# Patient Record
Sex: FEMALE | Race: BLACK OR AFRICAN AMERICAN | Employment: FULL TIME | URBAN - METROPOLITAN AREA
[De-identification: names, ages, dates, MRNs, and addresses within clinical notes are randomized per-mention and may not be internally consistent; named-entity substitution may affect disease eponyms.]

---

## 2017-01-13 ENCOUNTER — OFFICE VISIT (OUTPATIENT)
Dept: FAMILY MEDICINE CLINIC | Age: 47
End: 2017-01-13

## 2017-01-13 VITALS
OXYGEN SATURATION: 99 % | DIASTOLIC BLOOD PRESSURE: 85 MMHG | BODY MASS INDEX: 28 KG/M2 | HEIGHT: 64 IN | WEIGHT: 164 LBS | TEMPERATURE: 99.1 F | HEART RATE: 102 BPM | SYSTOLIC BLOOD PRESSURE: 132 MMHG | RESPIRATION RATE: 18 BRPM

## 2017-01-13 DIAGNOSIS — B96.89 ACUTE BACTERIAL SINUSITIS: ICD-10-CM

## 2017-01-13 DIAGNOSIS — R00.0 TACHYCARDIA: ICD-10-CM

## 2017-01-13 DIAGNOSIS — J01.90 ACUTE BACTERIAL SINUSITIS: ICD-10-CM

## 2017-01-13 DIAGNOSIS — J02.9 SORE THROAT: Primary | ICD-10-CM

## 2017-01-13 LAB
QUICKVUE INFLUENZA TEST: NEGATIVE
S PYO AG THROAT QL: NEGATIVE
VALID INTERNAL CONTROL?: YES
VALID INTERNAL CONTROL?: YES

## 2017-01-13 RX ORDER — AMOXICILLIN AND CLAVULANATE POTASSIUM 875; 125 MG/1; MG/1
1 TABLET, FILM COATED ORAL 2 TIMES DAILY
Qty: 14 TAB | Refills: 0 | Status: SHIPPED | OUTPATIENT
Start: 2017-01-13 | End: 2017-01-20

## 2017-01-13 NOTE — PATIENT INSTRUCTIONS
Sore Throat: Care Instructions  Your Care Instructions    Infection by bacteria or a virus causes most sore throats. Cigarette smoke, dry air, air pollution, allergies, and yelling can also cause a sore throat. Sore throats can be painful and annoying. Fortunately, most sore throats go away on their own. If you have a bacterial infection, your doctor may prescribe antibiotics. Follow-up care is a key part of your treatment and safety. Be sure to make and go to all appointments, and call your doctor if you are having problems. It's also a good idea to know your test results and keep a list of the medicines you take. How can you care for yourself at home? · If your doctor prescribed antibiotics, take them as directed. Do not stop taking them just because you feel better. You need to take the full course of antibiotics. · Gargle with warm salt water once an hour to help reduce swelling and relieve discomfort. Use 1 teaspoon of salt mixed in 1 cup of warm water. · Take an over-the-counter pain medicine, such as acetaminophen (Tylenol), ibuprofen (Advil, Motrin), or naproxen (Aleve). Read and follow all instructions on the label. · Be careful when taking over-the-counter cold or flu medicines and Tylenol at the same time. Many of these medicines have acetaminophen, which is Tylenol. Read the labels to make sure that you are not taking more than the recommended dose. Too much acetaminophen (Tylenol) can be harmful. · Drink plenty of fluids. Fluids may help soothe an irritated throat. Hot fluids, such as tea or soup, may help decrease throat pain. · Use over-the-counter throat lozenges to soothe pain. Regular cough drops or hard candy may also help. These should not be given to young children because of the risk of choking. · Do not smoke or allow others to smoke around you. If you need help quitting, talk to your doctor about stop-smoking programs and medicines.  These can increase your chances of quitting for good. · Use a vaporizer or humidifier to add moisture to your bedroom. Follow the directions for cleaning the machine. When should you call for help? Call your doctor now or seek immediate medical care if:  · You have new or worse trouble swallowing. · Your sore throat gets much worse on one side. Watch closely for changes in your health, and be sure to contact your doctor if you do not get better as expected. Where can you learn more? Go to http://natalie-racquel.info/. Enter [id number] [id number] in the search box to learn more about \"Sore Throat: Care Instructions. \"  Current as of: July 29, 2016  Content Version: 11.1  © 4999-6916 [employer]. Care instructions adapted under license by [employer] (which disclaims liability or warranty for this information). If you have questions about a medical condition or this instruction, always ask your healthcare professional. [place] [address] any warranty or liability for your use of this information. Sinusitis: Care Instructions  Your Care Instructions    Sinusitis is an infection of the lining of the sinus cavities in your head. Sinusitis often follows a cold. It causes pain and pressure in your head and face. In most cases, sinusitis gets better on its own in 1 to 2 weeks. But some mild symptoms may last for several weeks. Sometimes antibiotics are needed. Follow-up care is a key part of your treatment and safety. Be sure to make and go to all appointments, and call your doctor if you are having problems. It's also a good idea to know your test results and keep a list of the medicines you take. How can you care for yourself at home? · Take an over-the-counter pain medicine, such as acetaminophen (Tylenol), ibuprofen (Advil, Motrin), or naproxen (Aleve). Read and follow all instructions on the label. · If the doctor prescribed antibiotics, take them as directed.  Do not stop taking them just because you feel better. You need to take the full course of antibiotics. · Be careful when taking over-the-counter cold or flu medicines and Tylenol at the same time. Many of these medicines have acetaminophen, which is Tylenol. Read the labels to make sure that you are not taking more than the recommended dose. Too much acetaminophen (Tylenol) can be harmful. · Breathe warm, moist air from a steamy shower, a hot bath, or a sink filled with hot water. Avoid cold, dry air. Using a humidifier in your home may help. Follow the directions for cleaning the machine. · Use saline (saltwater) nasal washes to help keep your nasal passages open and wash out mucus and bacteria. You can buy saline nose drops at a grocery store or drugstore. Or you can make your own at home by adding 1 teaspoon of salt and 1 teaspoon of baking soda to 2 cups of distilled water. If you make your own, fill a bulb syringe with the solution, insert the tip into your nostril, and squeeze gently. Willia Slocumb your nose. · Put a hot, wet towel or a warm gel pack on your face 3 or 4 times a day for 5 to 10 minutes each time. · Try a decongestant nasal spray like oxymetazoline (Afrin). Do not use it for more than 3 days in a row. Using it for more than 3 days can make your congestion worse. When should you call for help? Call your doctor now or seek immediate medical care if:  · You have new or worse swelling or redness in your face or around your eyes. · You have a new or higher fever. Watch closely for changes in your health, and be sure to contact your doctor if:  · You have new or worse facial pain. · The mucus from your nose becomes thicker (like pus) or has new blood in it. · You are not getting better as expected. Where can you learn more? Go to http://natalie-racquel.info/. Enter W255 in the search box to learn more about \"Sinusitis: Care Instructions. \"  Current as of: July 29, 2016  Content Version: 11.1  © 8989-2751 [employer]orporated. Care instructions adapted under license by [employer] (which disclaims liability or warranty for this information). If you have questions about a medical condition or this instruction, always ask your healthcare professional. [address] any warranty or liability for your use of this information.

## 2017-01-13 NOTE — MR AVS SNAPSHOT
Visit Information Date & Time Provider Department Dept. Phone Encounter #  
 1/13/2017  3:00 PM Dee Arechiga NP Melina Watt 79 [id number] Upcoming Health Maintenance Date Due DTaP/Tdap/Td series (1 - Tdap) 8/5/1993 PAP AKA CERVICAL CYTOLOGY 8/5/1993 INFLUENZA AGE 9 TO ADULT 8/1/2016 Allergies as of 1/13/2017  Review Complete On: 1/13/2017 By: Dee Arechiga NP No Known Allergies Current Immunizations  Never Reviewed No immunizations on file. Not reviewed this visit You Were Diagnosed With   
  
 Codes Comments Sore throat    -  Primary ICD-10-CM: J02.9 ICD-9-CM: 871 Acute bacterial sinusitis     ICD-10-CM: J01.90, B96.89 
ICD-9-CM: 461.9 Tachycardia     ICD-10-CM: R00.0 ICD-9-CM: 918. 0 Vitals BP Pulse Temp Resp Height(growth percentile) Weight(growth percentile) 132/85 (BP 1 Location: Right arm, BP Patient Position: Sitting) (!) 102 99.1 °F (37.3 °C) (Oral) 18 5' 4\" (1.626 m) 164 lb (74.4 kg) LMP SpO2 BMI Smoking Status 01/10/2017 99% 28.15 kg/m2 Never Smoker BMI and BSA Data Body Mass Index Body Surface Area  
 28.15 kg/m 2 1.83 m 2 Preferred Pharmacy Pharmacy Name Phone [facility] [address], [facility] [facility] Your Updated Medication List  
  
   
This list is accurate as of: 1/13/17  4:19 PM.  Always use your most recent med list.  
  
  
  
  
 amoxicillin-clavulanate 875-125 mg per tablet Commonly known as:  AUGMENTIN Take 1 Tab by mouth two (2) times a day for 7 days. Indications: ACUTE BACTERIAL SINUSITIS Prescriptions Sent to Pharmacy Refills  
 amoxicillin-clavulanate (AUGMENTIN) 875-125 mg per tablet 0 Sig: Take 1 Tab by mouth two (2) times a day for 7 days. Indications: ACUTE BACTERIAL SINUSITIS  Class: Normal  
 Pharmacy: [id number] [facility] Ph #: [phone] Route: Oral  
  
We Performed the Following AMB POC RAPID INFLUENZA TEST [12875 CPT(R)] AMB POC RAPID STREP A [35629 CPT(R)] Patient Instructions Sore Throat: Care Instructions Your Care Instructions Infection by bacteria or a virus causes most sore throats. Cigarette smoke, dry air, air pollution, allergies, and yelling can also cause a sore throat. Sore throats can be painful and annoying. Fortunately, most sore throats go away on their own. If you have a bacterial infection, your doctor may prescribe antibiotics. Follow-up care is a key part of your treatment and safety. Be sure to make and go to all appointments, and call your doctor if you are having problems. It's also a good idea to know your test results and keep a list of the medicines you take. How can you care for yourself at home? · If your doctor prescribed antibiotics, take them as directed. Do not stop taking them just because you feel better. You need to take the full course of antibiotics. · Gargle with warm salt water once an hour to help reduce swelling and relieve discomfort. Use 1 teaspoon of salt mixed in 1 cup of warm water. · Take an over-the-counter pain medicine, such as acetaminophen (Tylenol), ibuprofen (Advil, Motrin), or naproxen (Aleve). Read and follow all instructions on the label. · Be careful when taking over-the-counter cold or flu medicines and Tylenol at the same time. Many of these medicines have acetaminophen, which is Tylenol. Read the labels to make sure that you are not taking more than the recommended dose. Too much acetaminophen (Tylenol) can be harmful. · Drink plenty of fluids. Fluids may help soothe an irritated throat. Hot fluids, such as tea or soup, may help decrease throat pain. · Use over-the-counter throat lozenges to soothe pain. Regular cough drops or hard candy may also help. These should not be given to young children because of the risk of choking. · Do not smoke or allow others to smoke around you. If you need help quitting, talk to your doctor about stop-smoking programs and medicines. These can increase your chances of quitting for good. · Use a vaporizer or humidifier to add moisture to your bedroom. Follow the directions for cleaning the machine. When should you call for help? Call your doctor now or seek immediate medical care if: 
· You have new or worse trouble swallowing. · Your sore throat gets much worse on one side. Watch closely for changes in your health, and be sure to contact your doctor if you do not get better as expected. Where can you learn more? Go to http://natalie-racquel.info/. Enter [phone] [phone] in the search box to learn more about \"Sore Throat: Care Instructions. \" Current as of: July 29, 2016 Content Version: 11.1 © 8973-9333 [employer]. Care instructions adapted under license by [employer] (which disclaims liability or warranty for this information). If you have questions about a medical condition or this instruction, always ask your healthcare professional. [address] any warranty or liability for your use of this information. Sinusitis: Care Instructions Your Care Instructions Sinusitis is an infection of the lining of the sinus cavities in your head. Sinusitis often follows a cold. It causes pain and pressure in your head and face. In most cases, sinusitis gets better on its own in 1 to 2 weeks. But some mild symptoms may last for several weeks. Sometimes antibiotics are needed. Follow-up care is a key part of your treatment and safety. Be sure to make and go to all appointments, and call your doctor if you are having problems. It's also a good idea to know your test results and keep a list of the medicines you take. How can you care for yourself at home?  
· Take an over-the-counter pain medicine, such as acetaminophen (Tylenol), ibuprofen (Advil, Motrin), or naproxen (Aleve). Read and follow all instructions on the label. · If the doctor prescribed antibiotics, take them as directed. Do not stop taking them just because you feel better. You need to take the full course of antibiotics. · Be careful when taking over-the-counter cold or flu medicines and Tylenol at the same time. Many of these medicines have acetaminophen, which is Tylenol. Read the labels to make sure that you are not taking more than the recommended dose. Too much acetaminophen (Tylenol) can be harmful. · Breathe warm, moist air from a steamy shower, a hot bath, or a sink filled with hot water. Avoid cold, dry air. Using a humidifier in your home may help. Follow the directions for cleaning the machine. · Use saline (saltwater) nasal washes to help keep your nasal passages open and wash out mucus and bacteria. You can buy saline nose drops at a grocery store or drugstore. Or you can make your own at home by adding 1 teaspoon of salt and 1 teaspoon of baking soda to 2 cups of distilled water. If you make your own, fill a bulb syringe with the solution, insert the tip into your nostril, and squeeze gently. Dianne Pickup your nose. · Put a hot, wet towel or a warm gel pack on your face 3 or 4 times a day for 5 to 10 minutes each time. · Try a decongestant nasal spray like oxymetazoline (Afrin). Do not use it for more than 3 days in a row. Using it for more than 3 days can make your congestion worse. When should you call for help? Call your doctor now or seek immediate medical care if: 
· You have new or worse swelling or redness in your face or around your eyes. · You have a new or higher fever. Watch closely for changes in your health, and be sure to contact your doctor if: 
· You have new or worse facial pain. · The mucus from your nose becomes thicker (like pus) or has new blood in it. · You are not getting better as expected. Where can you learn more? Go to http://natalie-racquel.info/. Enter R180 in the search box to learn more about \"Sinusitis: Care Instructions. \" Current as of: July 29, 2016 Content Version: 11.1 © 7319-4046 [employer]. Care instructions adapted under license by [employer] (which disclaims liability or warranty for this information). If you have questions about a medical condition or this instruction, always ask your healthcare professional. [address] any warranty or liability for your use of this information. Introducing [facility]! Romayne Duster introduces NeoAccel patient portal. Now you can access parts of your medical record, email your doctor's office, and request medication refills online. 1. In your internet browser, go to [email or web address] 2. Click on the First Time User? Click Here link in the Sign In box. You will see the New Member Sign Up page. 3. Enter your NeoAccel Access Code exactly as it appears below. You will not need to use this code after youve completed the sign-up process. If you do not sign up before the expiration date, you must request a new code. · NeoAccel Access Code: [email or web address] Expires: 4/13/2017  2:58 PM 
 
4. Enter the last four digits of your Social Security Number (xxxx) and Date of Birth (mm/dd/yyyy) as indicated and click Submit. You will be taken to the next sign-up page. 5. Create a NeoAccel ID. This will be your NeoAccel login ID and cannot be changed, so think of one that is secure and easy to remember. 6. Create a NeoAccel password. You can change your password at any time. 7. Enter your Password Reset Question and Answer. This can be used at a later time if you forget your password. 8. Enter your e-mail address. You will receive e-mail notification when new information is available in [address] 9. Click Sign Up. You can now view and download portions of your medical record. 10. Click the Download Summary menu link to download a portable copy of your medical information. If you have questions, please visit the Frequently Asked Questions section of the [facility] [employer] website. Remember, [facility] is NOT to be used for urgent needs. For medical emergencies, dial 911. Now available from your iPhone and Android! Please provide this summary of care documentation to your next provider. Your primary care clinician is listed as Idris Hutchison. If you have any questions after today's visit, please call [phone].

## 2017-01-13 NOTE — LETTER
NOTIFICATION RETURN TO WORK / SCHOOL 
 
1/13/2017 4:17 PM 
 
Ms. Brenna Garrett [address], [facility] [address] [place] [address] To Whom It May Concern: 
 
Brenna Garrett is currently under the care of Tiago Simental. She will return to work/school on: 1/16/17 If there are questions or concerns please have the patient contact our office.  
 
 
 
Sincerely, 
 
 
Sunil Ayala NP

## 2017-01-13 NOTE — PROGRESS NOTES
1. Have you been to the ER, urgent care clinic since your last visit? Hospitalized since your last visit? [facility] for similar symptoms    2. Have you seen or consulted any other health care providers outside of the [address] [place] [address] since your last visit? Include any pap smears or colon screening. No    Is someone accompanying this pt? no    Is the patient using any DME equipment during OV? no      Chief Complaint   Patient presents with    Sore Throat     Pt has been having symptoms for 4 days. Pt states that he grandchild was recently DX with the flu.     Cold

## 2017-01-13 NOTE — PROGRESS NOTES
JACKIE Garrett is a 42 y.o. female   Chief Complaint   Patient presents with    Sore Throat     Pt has been having symptoms for 4 days. Pt states that he grandchild was recently DX with the flu.  Cold   Reports green sputum with shortness of breath and wheezing since yesterday. Reports chills but denies fevers. Reports taking OTC robitussin. Reports she declined the flu shot. Reports diarrhea yesterday that resolved with OTC medication. Reports headache currently 9/10. Denies chest pain but reports chest shortness from coughing. Denies shortness of breath currently but admits to an episode yesterday. Reports laryngitis started 2-3 days ago. Reports anxiousness and not feeling well. Past Medical History  Past Medical History   Diagnosis Date    Hypertension        Surgical History  Past Surgical History   Procedure Laterality Date    Hx tubal ligation          Medications      Allergies  No Known Allergies    Family History  Family History   Problem Relation Age of Onset    Diabetes Sister     Hypertension Sister     Elevated Lipids Sister     Hypertension Sister     Hypertension Sister     Hypertension Sister     Hypertension Sister     Hypertension Sister        Social History  Social History     Social History    Marital status:      Spouse name: N/A    Number of children: N/A    Years of education: N/A     Occupational History    Not on file. Social History Main Topics    Smoking status: Never Smoker    Smokeless tobacco: Never Used    Alcohol use No    Drug use: No    Sexual activity: Yes     Partners: Male     Birth control/ protection: Surgical     Other Topics Concern    Not on file     Social History Narrative    No narrative on file       Problem List  There is no problem list on file for this patient. Review of Systems  Review of Systems   Constitutional: Positive for chills and diaphoresis. Negative for fever. HENT: Positive for sore throat.  Negative for ear pain. Respiratory: Positive for cough, sputum production, shortness of breath and wheezing. Cardiovascular: Negative for chest pain. Gastrointestinal: Negative for abdominal pain, nausea and vomiting. Neurological: Positive for headaches. Psychiatric/Behavioral: The patient is nervous/anxious. Vital Signs  Vitals:    01/13/17 1527   BP: 132/85   Pulse: (!) 102   Resp: 18   Temp: 99.1 °F (37.3 °C)   TempSrc: Oral   SpO2: 99%   Weight: 164 lb (74.4 kg)   Height: 5' 4\" (1.626 m)   PainSc:   9   PainLoc: Generalized   LMP: 01/10/2017     PHQ 2 / 9, over the last two weeks 1/13/2017   Little interest or pleasure in doing things Not at all   Feeling down, depressed or hopeless Not at all   Total Score PHQ 2 0     Physical Exam  Physical Exam   Constitutional: She is oriented to person, place, and time. She appears ill. HENT:   Right Ear: Tympanic membrane normal.   Left Ear: Tympanic membrane normal.   Nose: Right sinus exhibits frontal sinus tenderness. Left sinus exhibits maxillary sinus tenderness and frontal sinus tenderness. Mouth/Throat: Oropharyngeal exudate present. No posterior oropharyngeal edema or posterior oropharyngeal erythema. Tonsils are 0 on the right. Tonsils are 0 on the left. Neck: Normal range of motion. Cardiovascular: Regular rhythm and normal heart sounds. Tachycardia present. Pulmonary/Chest: Effort normal and breath sounds normal. No respiratory distress. Abdominal: Soft. Neurological: She is alert and oriented to person, place, and time. Diagnostics  Orders Placed This Encounter    AMB POC RAPID STREP A    AMB POC RAPID INFLUENZA TEST    amoxicillin-clavulanate (AUGMENTIN) 875-125 mg per tablet     Sig: Take 1 Tab by mouth two (2) times a day for 7 days.  Indications: ACUTE BACTERIAL SINUSITIS     Dispense:  14 Tab     Refill:  0       Results  Results for orders placed or performed in visit on 01/13/17   AMB POC RAPID STREP A   Result Value Ref Range    VALID INTERNAL CONTROL POC Yes     Group A Strep Ag Negative Negative   AMB POC RAPID INFLUENZA TEST   Result Value Ref Range    VALID INTERNAL CONTROL POC Yes     QuickVue Influenza test Negative Negative       Assessment and Plan  Luma Denise was seen today for sore throat and cold. Diagnoses and all orders for this visit:    Sore throat  -     AMB POC RAPID STREP A  -     AMB POC RAPID INFLUENZA TEST    Acute bacterial sinusitis  -     amoxicillin-clavulanate (AUGMENTIN) 875-125 mg per tablet; Take 1 Tab by mouth two (2) times a day for 7 days. Indications: ACUTE BACTERIAL SINUSITIS    Tachycardia    Use OTC ibuprofen for throat pain and headache. Warm salt water gargles. Educated on signs and symptoms of medication. Patient educated on signs and symptoms of stroke and heart attack and instructed to seek emergency assistance if any occurs. Patient verbalized understanding. After care summary printed and reviewed with patient. Plan reviewed with patient. Questions answered. Patient verbalized understanding of plan and is in agreement with plan. Patient to follow up in one month or earlier if symptoms worsen. Encouraged the use of my chart.     RICHARD Watt

## 2017-01-16 ENCOUNTER — TELEPHONE (OUTPATIENT)
Dept: FAMILY MEDICINE CLINIC | Age: 47
End: 2017-01-16

## 2017-01-16 ENCOUNTER — OFFICE VISIT (OUTPATIENT)
Dept: FAMILY MEDICINE CLINIC | Age: 47
End: 2017-01-16

## 2017-01-16 VITALS
SYSTOLIC BLOOD PRESSURE: 140 MMHG | DIASTOLIC BLOOD PRESSURE: 80 MMHG | HEIGHT: 64 IN | HEART RATE: 111 BPM | BODY MASS INDEX: 28 KG/M2 | RESPIRATION RATE: 17 BRPM | OXYGEN SATURATION: 97 % | TEMPERATURE: 98.9 F | WEIGHT: 164 LBS

## 2017-01-16 DIAGNOSIS — F41.9 ANXIETY: ICD-10-CM

## 2017-01-16 DIAGNOSIS — R06.2 WHEEZING: ICD-10-CM

## 2017-01-16 DIAGNOSIS — R05.9 COUGH: Primary | ICD-10-CM

## 2017-01-16 DIAGNOSIS — R00.0 TACHYCARDIA: ICD-10-CM

## 2017-01-16 RX ORDER — ESCITALOPRAM OXALATE 10 MG/1
10 TABLET ORAL DAILY
Qty: 30 TAB | Refills: 0 | Status: SHIPPED | OUTPATIENT
Start: 2017-01-16

## 2017-01-16 RX ORDER — BENZONATATE 200 MG/1
200 CAPSULE ORAL
Qty: 30 CAP | Refills: 0 | Status: SHIPPED | OUTPATIENT
Start: 2017-01-16 | End: 2017-01-23

## 2017-01-16 NOTE — MR AVS SNAPSHOT
Visit Information Date & Time Provider Department Dept. Phone Encounter #  
 1/16/2017 11:30 AM Sunil Ayala NP [facility] [phone] [id number] Follow-up Instructions Return in about 2 weeks (around 1/30/2017), or if symptoms worsen or fail to improve, for anxiety. Your Appointments 2/13/2017 10:00 AM  
ROUTINE CARE with Sunil Ayala NP [address] (--) Appt Note: 1 month follow up [place] [address] [id number] [address] [address] [phone]  
  
   
 [place] [address] [id number] [address] [address] Upcoming Health Maintenance Date Due DTaP/Tdap/Td series (1 - Tdap) 8/5/1993 PAP AKA CERVICAL CYTOLOGY 8/5/1993 INFLUENZA AGE 9 TO ADULT 8/1/2016 Allergies as of 1/16/2017  Review Complete On: 1/16/2017 By: Sunil Ayala NP No Known Allergies Current Immunizations  Never Reviewed No immunizations on file. Not reviewed this visit You Were Diagnosed With   
  
 Codes Comments Cough    -  Primary ICD-10-CM: J19 ICD-9-CM: 786.2 Anxiety     ICD-10-CM: F41.9 ICD-9-CM: 300.00 Vitals BP Pulse Temp Resp Height(growth percentile) Weight(growth percentile) 140/80 (BP 1 Location: Right arm, BP Patient Position: Sitting) (!) 111 98.9 °F (37.2 °C) (Oral) 17 5' 4\" (1.626 m) 164 lb (74.4 kg) LMP SpO2 BMI Smoking Status 01/10/2017 97% 28.15 kg/m2 Never Smoker BMI and BSA Data Body Mass Index Body Surface Area  
 28.15 kg/m 2 1.83 m 2 Preferred Pharmacy Pharmacy Name Phone [facility] [address], [address] Your Updated Medication List  
  
   
This list is accurate as of: 1/16/17 12:14 PM.  Always use your most recent med list.  
  
  
  
  
 amoxicillin-clavulanate 875-125 mg per tablet Commonly known as:  AUGMENTIN Take 1 Tab by mouth two (2) times a day for 7 days.  Indications: ACUTE BACTERIAL SINUSITIS  
  
 benzonatate 200 mg capsule Commonly known as:  TESSALON Take 1 Cap by mouth three (3) times daily as needed for Cough for up to 7 days. escitalopram oxalate 10 mg tablet Commonly known as:  Halima Uriarte Take 1 Tab by mouth daily. Prescriptions Sent to Pharmacy Refills  
 escitalopram oxalate (LEXAPRO) 10 mg tablet 0 Sig: Take 1 Tab by mouth daily. Class: Normal  
 Pharmacy: [id number] Ph #: [phone] Route: Oral  
 benzonatate (TESSALON) 200 mg capsule 0 Sig: Take 1 Cap by mouth three (3) times daily as needed for Cough for up to 7 days. Class: Normal  
 Pharmacy: [id number] Ph #: [phone] Route: Oral  
  
Follow-up Instructions Return in about 2 weeks (around 1/30/2017), or if symptoms worsen or fail to improve, for anxiety. Patient Instructions Escitalopram (By mouth) Escitalopram (hc-bng-EYG-oh-pram) Treats depression and generalized anxiety disorder (FELICIA). Brand Name(s):Lexapro There may be other brand names for this medicine. When This Medicine Should Not Be Used: This medicine is not right for everyone. Do not use it if you had an allergic reaction to escitalopram or citalopram. 
How to Use This Medicine:  
Liquid, Tablet · Take this medicine as directed. You may need to take it for a month or more before you feel better. Your dose may need to be changed to find out what works best for you. · Measure the oral liquid medicine with a marked measuring spoon, oral syringe, or medicine cup. · This medicine should come with a Medication Guide. Ask your pharmacist for a copy if you do not have one. · Missed dose: Take a dose as soon as you remember. If it is almost time for your next dose, wait until then and take a regular dose. Do not take extra medicine to make up for a missed dose. · Store the medicine in a closed container at room temperature, away from heat, moisture, and direct light. Drugs and Foods to Avoid: Ask your doctor or pharmacist before using any other medicine, including over-the-counter medicines, vitamins, and herbal products. · Do not use this medicine together with pimozide. Do not use this medicine and an MAO inhibitor (MAOI) within 14 days of each other. · Some medicines can affect how escitalopram works. Tell your doctor if you are using the following:  
¨ Buspirone, carbamazepine, cimetidine, fentanyl, lithium, Junior's wort, tramadol, or tryptophan supplements ¨ An NSAID pain or arthritis medicine (such as aspirin, diclofenac, ibuprofen, naproxen), triptan medicine to treat migraine headaches, a blood thinner (such as warfarin), or a diuretic (water pill) · Do not drink alcohol while you are using this medicine. Warnings While Using This Medicine: · Tell your doctor if you are pregnant or breastfeeding, or if you have kidney disease, liver disease, bleeding problems, glaucoma, heart disease, or a seizure disorder. · For some children, teenagers, and young adults, this medicine may increase mental or emotional problems. This may lead to thoughts of suicide and violence. Talk with your doctor right away if you have any thoughts or behavior changes that concern you. Tell your doctor if you or anyone in your family has a history of bipolar disorder or suicide attempts. · This medicine may cause the following problems:  
¨ Serotonin syndrome (more likely when taken with certain medicines) ¨ Low sodium levels ¨ Increased risk of bleeding problems · This medicine may make you dizzy or drowsy. Do not drive or do anything that could be dangerous until you know how this medicine affects you. · Your doctor may want to monitor your child's weight and height, because this medicine may cause decreased appetite and weight loss in children. · Do not stop using this medicine suddenly. Your doctor will need to slowly decrease your dose before you stop it completely. · Your doctor will check your progress and the effects of this medicine at regular visits. Keep all appointments. · Keep all medicine out of the reach of children. Never share your medicine with anyone. Possible Side Effects While Using This Medicine:  
Call your doctor right away if you notice any of these side effects: · Allergic reaction: Itching or hives, swelling in your face or hands, swelling or tingling in your mouth or throat, chest tightness, trouble breathing · Anxiety, restlessness, fever, sweating, muscle spasms, nausea, vomiting, diarrhea, seeing or hearing things that are not there · Confusion, weakness, and muscle twitching · Fast, pounding, or uneven heartbeat · Feeling more excited or energetic than usual, racing thoughts, trouble sleeping · Eye pain, vision changes, seeing halos around lights · Seizures · Thoughts of hurting yourself or others, unusual behavior · Unusual bleeding or bruising If you notice these less serious side effects, talk with your doctor: · Dizziness, drowsiness, or sleepiness · Dry mouth 
· Headache · Nausea, constipation, diarrhea · Sexual problems If you notice other side effects that you think are caused by this medicine, tell your doctor. Call your doctor for medical advice about side effects. You may report side effects to FDA at 1-574-FDA-5359 © 2016 [address] is for End User's use only and may not be sold, redistributed or otherwise used for commercial purposes. The above information is an  only. It is not intended as medical advice for individual conditions or treatments. Talk to your doctor, nurse or pharmacist before following any medical regimen to see if it is safe and effective for you. Cough: Care Instructions Your Care Instructions A cough is your body's response to something that bothers your throat or airways. Many things can cause a cough. You might cough because of a cold or the flu, bronchitis, or asthma. Smoking, postnasal drip, allergies, and stomach acid that backs up into your throat also can cause coughs. A cough is a symptom, not a disease. Most coughs stop when the cause, such as a cold, goes away. You can take a few steps at home to cough less and feel better. Follow-up care is a key part of your treatment and safety. Be sure to make and go to all appointments, and call your doctor if you are having problems. It's also a good idea to know your test results and keep a list of the medicines you take. How can you care for yourself at home? · Drink lots of water and other fluids. This helps thin the mucus and soothes a dry or sore throat. Honey or lemon juice in hot water or tea may ease a dry cough. · Take cough medicine as directed by your doctor. · Prop up your head on pillows to help you breathe and ease a dry cough. · Try cough drops to soothe a dry or sore throat. Cough drops don't stop a cough. Medicine-flavored cough drops are no better than candy-flavored drops or hard candy. · Do not smoke. Avoid secondhand smoke. If you need help quitting, talk to your doctor about stop-smoking programs and medicines. These can increase your chances of quitting for good. When should you call for help? Call 911 anytime you think you may need emergency care. For example, call if: 
· You have severe trouble breathing. Call your doctor now or seek immediate medical care if: 
· You cough up blood. · You have new or worse trouble breathing. · You have a new or higher fever. · You have a new rash. Watch closely for changes in your health, and be sure to contact your doctor if: 
· You cough more deeply or more often, especially if you notice more mucus or a change in the color of your mucus. · You have new symptoms, such as a sore throat, an earache, or sinus pain. · You do not get better as expected. Where can you learn more? Go to http://natalie-racquel.info/. Enter D279 in the search box to learn more about \"Cough: Care Instructions. \" Current as of: May 27, 2016 Content Version: 11.1 © 2170-0923 [employer]. Care instructions adapted under license by [employer] (which disclaims liability or warranty for this information). If you have questions about a medical condition or this instruction, always ask your healthcare professional. [place] [address] any warranty or liability for your use of this information. Introducing [facility]! Hayden Horn introduces [employer] patient portal. Now you can access parts of your medical record, email your doctor's office, and request medication refills online. 1. In your internet browser, go to [email or web address] 2. Click on the First Time User? Click Here link in the Sign In box. You will see the New Member Sign Up page. 3. Enter your [employer] Access Code exactly as it appears below. You will not need to use this code after youve completed the sign-up process. If you do not sign up before the expiration date, you must request a new code. · [employer] Access Code: [email or web address] Expires: 4/13/2017  2:58 PM 
 
4. Enter the last four digits of your Social Security Number (xxxx) and Date of Birth (mm/dd/yyyy) as indicated and click Submit. You will be taken to the next sign-up page. 5. Create a [employer] ID. This will be your [employer] login ID and cannot be changed, so think of one that is secure and easy to remember. 6. Create a [employer] password. You can change your password at any time. 7. Enter your Password Reset Question and Answer. This can be used at a later time if you forget your password. 8. Enter your e-mail address. You will receive e-mail notification when new information is available in [address] 9. Click Sign Up. You can now view and download portions of your medical record. 10. Click the Download Summary menu link to download a portable copy of your medical information. If you have questions, please visit the Frequently Asked Questions section of the [employer] website. Remember, [employer] is NOT to be used for urgent needs. For medical emergencies, dial 911. Now available from your iPhone and Android! Please provide this summary of care documentation to your next provider. Your primary care clinician is listed as Oneal Lennox. If you have any questions after today's visit, please call [phone].

## 2017-01-16 NOTE — TELEPHONE ENCOUNTER
Pt was seen Friday and was told to call back today if she was not any better. Her voice is back, but cold is still in her chest. She wants to get a note to be out of work today since she works in the [place].

## 2017-01-16 NOTE — LETTER
NOTIFICATION RETURN TO WORK / SCHOOL 
 
1/16/2017 12:14 PM 
 
Ms. Paul Delgado [address] [facility] [address] [place] [phone] To Whom It May Concern: 
 
Paul Delgado is currently under the care of Tiago Simental. She will return to work/school on: 1/17/17 If there are questions or concerns please have the patient contact our office.  
 
 
 
Sincerely, 
 
 
Ayan Russo NP

## 2017-01-16 NOTE — PATIENT INSTRUCTIONS
Escitalopram (By mouth)   Escitalopram (bi-izq-KIL-oh-pram)  Treats depression and generalized anxiety disorder (FELICIA). Brand Name(s):Lexapro   There may be other brand names for this medicine. When This Medicine Should Not Be Used: This medicine is not right for everyone. Do not use it if you had an allergic reaction to escitalopram or citalopram.  How to Use This Medicine:   Liquid, Tablet  · Take this medicine as directed. You may need to take it for a month or more before you feel better. Your dose may need to be changed to find out what works best for you. · Measure the oral liquid medicine with a marked measuring spoon, oral syringe, or medicine cup. · This medicine should come with a Medication Guide. Ask your pharmacist for a copy if you do not have one. · Missed dose: Take a dose as soon as you remember. If it is almost time for your next dose, wait until then and take a regular dose. Do not take extra medicine to make up for a missed dose. · Store the medicine in a closed container at room temperature, away from heat, moisture, and direct light. Drugs and Foods to Avoid:   Ask your doctor or pharmacist before using any other medicine, including over-the-counter medicines, vitamins, and herbal products. · Do not use this medicine together with pimozide. Do not use this medicine and an MAO inhibitor (MAOI) within 14 days of each other. · Some medicines can affect how escitalopram works. Tell your doctor if you are using the following:   ¨ Buspirone, carbamazepine, cimetidine, fentanyl, lithium, Junior's wort, tramadol, or tryptophan supplements  ¨ An NSAID pain or arthritis medicine (such as aspirin, diclofenac, ibuprofen, naproxen), triptan medicine to treat migraine headaches, a blood thinner (such as warfarin), or a diuretic (water pill)  · Do not drink alcohol while you are using this medicine.   Warnings While Using This Medicine:   · Tell your doctor if you are pregnant or breastfeeding, or if you have kidney disease, liver disease, bleeding problems, glaucoma, heart disease, or a seizure disorder. · For some children, teenagers, and young adults, this medicine may increase mental or emotional problems. This may lead to thoughts of suicide and violence. Talk with your doctor right away if you have any thoughts or behavior changes that concern you. Tell your doctor if you or anyone in your family has a history of bipolar disorder or suicide attempts. · This medicine may cause the following problems:   ¨ Serotonin syndrome (more likely when taken with certain medicines)  ¨ Low sodium levels  ¨ Increased risk of bleeding problems  · This medicine may make you dizzy or drowsy. Do not drive or do anything that could be dangerous until you know how this medicine affects you. · Your doctor may want to monitor your child's weight and height, because this medicine may cause decreased appetite and weight loss in children. · Do not stop using this medicine suddenly. Your doctor will need to slowly decrease your dose before you stop it completely. · Your doctor will check your progress and the effects of this medicine at regular visits. Keep all appointments. · Keep all medicine out of the reach of children. Never share your medicine with anyone.   Possible Side Effects While Using This Medicine:   Call your doctor right away if you notice any of these side effects:  · Allergic reaction: Itching or hives, swelling in your face or hands, swelling or tingling in your mouth or throat, chest tightness, trouble breathing  · Anxiety, restlessness, fever, sweating, muscle spasms, nausea, vomiting, diarrhea, seeing or hearing things that are not there  · Confusion, weakness, and muscle twitching  · Fast, pounding, or uneven heartbeat  · Feeling more excited or energetic than usual, racing thoughts, trouble sleeping  · Eye pain, vision changes, seeing halos around lights  · Seizures  · Thoughts of hurting yourself or others, unusual behavior  · Unusual bleeding or bruising  If you notice these less serious side effects, talk with your doctor:   · Dizziness, drowsiness, or sleepiness  · Dry mouth  · Headache  · Nausea, constipation, diarrhea  · Sexual problems  If you notice other side effects that you think are caused by this medicine, tell your doctor. Call your doctor for medical advice about side effects. You may report side effects to FDA at 4-104-LCE-8940  © 2016 [address] is for End User's use only and may not be sold, redistributed or otherwise used for commercial purposes. The above information is an  only. It is not intended as medical advice for individual conditions or treatments. Talk to your doctor, nurse or pharmacist before following any medical regimen to see if it is safe and effective for you. Cough: Care Instructions  Your Care Instructions  A cough is your body's response to something that bothers your throat or airways. Many things can cause a cough. You might cough because of a cold or the flu, bronchitis, or asthma. Smoking, postnasal drip, allergies, and stomach acid that backs up into your throat also can cause coughs. A cough is a symptom, not a disease. Most coughs stop when the cause, such as a cold, goes away. You can take a few steps at home to cough less and feel better. Follow-up care is a key part of your treatment and safety. Be sure to make and go to all appointments, and call your doctor if you are having problems. It's also a good idea to know your test results and keep a list of the medicines you take. How can you care for yourself at home? · Drink lots of water and other fluids. This helps thin the mucus and soothes a dry or sore throat. Honey or lemon juice in hot water or tea may ease a dry cough. · Take cough medicine as directed by your doctor. · Prop up your head on pillows to help you breathe and ease a dry cough.   · Try cough drops to soothe a dry or sore throat. Cough drops don't stop a cough. Medicine-flavored cough drops are no better than candy-flavored drops or hard candy. · Do not smoke. Avoid secondhand smoke. If you need help quitting, talk to your doctor about stop-smoking programs and medicines. These can increase your chances of quitting for good. When should you call for help? Call 911 anytime you think you may need emergency care. For example, call if:  · You have severe trouble breathing. Call your doctor now or seek immediate medical care if:  · You cough up blood. · You have new or worse trouble breathing. · You have a new or higher fever. · You have a new rash. Watch closely for changes in your health, and be sure to contact your doctor if:  · You cough more deeply or more often, especially if you notice more mucus or a change in the color of your mucus. · You have new symptoms, such as a sore throat, an earache, or sinus pain. · You do not get better as expected. Where can you learn more? Go to http://natalie-racquel.info/. Enter D279 in the search box to learn more about \"Cough: Care Instructions. \"  Current as of: May 27, 2016  Content Version: 11.1  © 4232-7107 [employer]. Care instructions adapted under license by [employer] (which disclaims liability or warranty for this information). If you have questions about a medical condition or this instruction, always ask your healthcare professional. [place] [address] any warranty or liability for your use of this information.

## 2017-01-16 NOTE — PROGRESS NOTES
JACKIE Delgado is a 42 y.o. female  Chief Complaint   Patient presents with    Cold Symptoms     Pt states she is still having some chest congestion. Pt states she is having tightness in chest.    Anxiety     Pt feels that the tightness in chest may be due to anxiety. Anxiety- Reports a history or depression and anxiety. Reports being on xanax for anxiety but took self off of medications as she thought she would be able to handle things. Reports anxiousness at night and not being able to sleep. Denies desire to hurt or harm herself or others. Denies suicidal ideations. Cold symptoms-  Reports starting antibiotic on Friday. Reports coughing and wheezing. Reports obtained an inhaler from the ER in [place] two weeks ago along with cough medicine. However, reports not using these medications as prescribed. Severe coughing that is causing her to vomit. Reports chest tenderness and soreness from coughing. Denies shortness of breath. Denies chest palpitations. Past Medical History  Past Medical History   Diagnosis Date    Hypertension        Surgical History  Past Surgical History   Procedure Laterality Date    Hx tubal ligation          Medications  Current Outpatient Prescriptions   Medication Sig Dispense Refill    escitalopram oxalate (LEXAPRO) 10 mg tablet Take 1 Tab by mouth daily. 30 Tab 0    benzonatate (TESSALON) 200 mg capsule Take 1 Cap by mouth three (3) times daily as needed for Cough for up to 7 days. 30 Cap 0    amoxicillin-clavulanate (AUGMENTIN) 875-125 mg per tablet Take 1 Tab by mouth two (2) times a day for 7 days.  Indications: ACUTE BACTERIAL SINUSITIS 14 Tab 0       Allergies  No Known Allergies    Family History  Family History   Problem Relation Age of Onset    Diabetes Sister     Hypertension Sister     Elevated Lipids Sister     Hypertension Sister     Hypertension Sister     Hypertension Sister     Hypertension Sister     Hypertension Sister        Social History  Social History     Social History    Marital status:      Spouse name: N/A    Number of children: N/A    Years of education: N/A     Occupational History    Not on file. Social History Main Topics    Smoking status: Never Smoker    Smokeless tobacco: Never Used    Alcohol use No    Drug use: No    Sexual activity: Yes     Partners: Male     Birth control/ protection: Surgical     Other Topics Concern    Not on file     Social History Narrative       Problem List  There is no problem list on file for this patient. Review of Systems  Review of Systems   Constitutional: Positive for malaise/fatigue. Negative for chills and fever. HENT: Negative for ear pain. Respiratory: Positive for cough and shortness of breath. Negative for wheezing. Cardiovascular: Negative for chest pain. Gastrointestinal: Positive for vomiting. Neurological: Negative for weakness and headaches. Psychiatric/Behavioral: Positive for depression. Negative for suicidal ideas. The patient is nervous/anxious. Vital Signs  Vitals:    01/16/17 1144   BP: 140/80   Pulse: (!) 111   Resp: 17   Temp: 98.9 °F (37.2 °C)   TempSrc: Oral   SpO2: 97%   Weight: 164 lb (74.4 kg)   Height: 5' 4\" (1.626 m)   PainSc:   0 - No pain   LMP: 01/10/2017     PHQ 2 / 9, over the last two weeks 1/13/2017   Little interest or pleasure in doing things Not at all   Feeling down, depressed or hopeless Not at all   Total Score PHQ 2 0     FELICIA-7 - score of 15 - Very Difficult  Physical Exam  Physical Exam   Constitutional: She is oriented to person, place, and time. HENT:   Right Ear: Tympanic membrane and ear canal normal.   Left Ear: Tympanic membrane and ear canal normal.   Nose: Mucosal edema present. Mouth/Throat: Oropharynx is clear and moist and mucous membranes are normal. Tonsils are 0 on the right. Tonsils are 0 on the left. No tonsillar exudate. Cardiovascular: Regular rhythm and normal pulses.   Tachycardia present. Pulmonary/Chest: Effort normal and breath sounds normal.   Abdominal: Soft. Bowel sounds are normal.   Neurological: She is alert and oriented to person, place, and time. Skin: Skin is warm. Psychiatric: She has a normal mood and affect. Her behavior is normal. Judgment and thought content normal.   Vitals reviewed. Diagnostics  Orders Placed This Encounter    escitalopram oxalate (LEXAPRO) 10 mg tablet     Sig: Take 1 Tab by mouth daily. Dispense:  30 Tab     Refill:  0    benzonatate (TESSALON) 200 mg capsule     Sig: Take 1 Cap by mouth three (3) times daily as needed for Cough for up to 7 days. Dispense:  30 Cap     Refill:  0       Results  Results for orders placed or performed in visit on 01/13/17   AMB POC RAPID STREP A   Result Value Ref Range    VALID INTERNAL CONTROL POC Yes     Group A Strep Ag Negative Negative   AMB POC RAPID INFLUENZA TEST   Result Value Ref Range    VALID INTERNAL CONTROL POC Yes     QuickVue Influenza test Negative Negative       Assessment and Plan  Omega Hernandez was seen today for cold symptoms and anxiety. Diagnoses and all orders for this visit:    Cough  -     benzonatate (TESSALON) 200 mg capsule; Take 1 Cap by mouth three (3) times daily as needed for Cough for up to 7 days. Anxiety  -     escitalopram oxalate (LEXAPRO) 10 mg tablet; Take 1 Tab by mouth daily. Wheezing    Tachycardia      Continue use of inhaler for wheezing and take as prescribed. Will continue to evaluate tachycardia in relation to anxiety. Aware of signs and symptoms of stroke and MI and instructed to seek emergency care if signs or symptoms occured. Medications reviewed with patient and side effects reviewed. Patient verbalized understanding. Instructed to continue antibiotic and to take the full dose as prescribed. After care summary printed and reviewed with patient. Plan reviewed with patient. Questions answered.  Patient verbalized understanding of plan and is in agreement with plan. Patient to follow up in one two weeks or earlier if symptoms worsen. Return to work letter given.       RICHARD Watt

## 2017-01-16 NOTE — PROGRESS NOTES
1. Have you been to the ER, urgent care clinic since your last visit? Hospitalized since your last visit? No    2. Have you seen or consulted any other health care providers outside of the [facility] since your last visit? Include any pap smears or colon screening. No    Is someone accompanying this pt? no    Is the patient using any DME equipment during OV? no      Chief Complaint   Patient presents with    Cold Symptoms     Pt states she is still having some chest congestion. Pt states she is having tightness in chest.    Anxiety     Pt feels that the tightness in chest may be due to anxiety.

## 2017-10-17 ENCOUNTER — OFFICE VISIT (OUTPATIENT)
Dept: ORTHOPEDIC SURGERY | Age: 47
End: 2017-10-17

## 2017-10-17 VITALS
OXYGEN SATURATION: 100 % | HEIGHT: 64 IN | TEMPERATURE: 98 F | HEART RATE: 99 BPM | DIASTOLIC BLOOD PRESSURE: 71 MMHG | RESPIRATION RATE: 18 BRPM | SYSTOLIC BLOOD PRESSURE: 116 MMHG | WEIGHT: 158.4 LBS | BODY MASS INDEX: 27.04 KG/M2

## 2017-10-17 DIAGNOSIS — M54.41 ACUTE RIGHT-SIDED LOW BACK PAIN WITH RIGHT-SIDED SCIATICA: ICD-10-CM

## 2017-10-17 DIAGNOSIS — S39.012A STRAIN OF LUMBAR REGION, INITIAL ENCOUNTER: Primary | ICD-10-CM

## 2017-10-17 RX ORDER — METAXALONE 800 MG/1
800 TABLET ORAL
Qty: 60 TAB | Refills: 0 | Status: SHIPPED | OUTPATIENT
Start: 2017-10-17 | End: 2017-11-06 | Stop reason: CLARIF

## 2017-10-17 RX ORDER — DICLOFENAC SODIUM 75 MG/1
75 TABLET, DELAYED RELEASE ORAL 2 TIMES DAILY WITH MEALS
Qty: 60 TAB | Refills: 0 | Status: SHIPPED | OUTPATIENT
Start: 2017-10-17

## 2017-10-17 NOTE — MR AVS SNAPSHOT
Visit Information Date & Time Provider Department Dept. Phone Encounter #  
 10/17/2017  1:30 PM Harman Rebolledo, [address] [facility] [phone] [id number] Upcoming Health Maintenance Date Due DTaP/Tdap/Td series (1 - Tdap) 8/5/1993 PAP AKA CERVICAL CYTOLOGY 8/5/1993 INFLUENZA AGE 9 TO ADULT 8/1/2017 Allergies as of 10/17/2017  Review Complete On: 10/17/2017 By: Harman Rebolledo MD  
 No Known Allergies Current Immunizations  Never Reviewed No immunizations on file. Not reviewed this visit You Were Diagnosed With   
  
 Codes Comments Acute right-sided low back pain with right-sided sciatica    -  Primary ICD-10-CM: M54.41 
ICD-9-CM: 724.2, 724.3 Vitals BP Pulse Temp Resp Height(growth percentile) Weight(growth percentile) 116/71 (BP 1 Location: Right arm, BP Patient Position: Sitting) 99 98 °F (36.7 °C) (Oral) 18 5' 4\" (1.626 m) 158 lb 6.4 oz (71.8 kg) SpO2 BMI Smoking Status 100% 27.19 kg/m2 Never Smoker BMI and BSA Data Body Mass Index Body Surface Area  
 27.19 kg/m 2 1.8 m 2 Preferred Pharmacy Pharmacy Name Phone [facility] [address], [address] [facility] Your Updated Medication List  
  
   
This list is accurate as of: 10/17/17  2:36 PM.  Always use your most recent med list.  
  
  
  
  
 diclofenac EC 75 mg EC tablet Commonly known as:  VOLTAREN Take 1 Tab by mouth two (2) times daily (with meals). escitalopram oxalate 10 mg tablet Commonly known as:  Cherre Jews Take 1 Tab by mouth daily. metaxalone 800 mg tablet Commonly known as:  SKELAXIN Take 1 Tab by mouth two (2) times daily as needed for Pain. Prescriptions Sent to Pharmacy Refills  
 metaxalone (SKELAXIN) 800 mg tablet 0 Sig: Take 1 Tab by mouth two (2) times daily as needed for Pain.   
 Class: Normal  
 Pharmacy: [address] Ph #: [phone] Route: Oral  
 diclofenac EC (VOLTAREN) 75 mg EC tablet 0 Sig: Take 1 Tab by mouth two (2) times daily (with meals). Class: Normal  
 Pharmacy: [address] Ph #: [phone] Route: Oral  
  
We Performed the Following AMB POC XRAY, SPINE, LUMBOSACRAL; 2 O [38489 CPT(R)] REFERRAL TO PHYSICAL THERAPY [HZV46 Custom] Comments:  
 Evaluation and treatment of low back pain. Please treat two to three times a week for four weeks. Please utilize the following: low frequency US, stretching, and teach how to lift, and move equipment without harming her back (back school). .  
  
Referral Information Referral ID Referred By Referred To  
  
 [id number] [facility] [address] [facility]   
   [facility] [address] Phone: [phone] Fax: [phone] Visits Status Start Date End Date 1 New Request 10/17/17 10/17/18 If your referral has a status of pending review or denied, additional information will be sent to support the outcome of this decision. Patient Instructions Please follow up in 2 weeks. You are advised to contact us if your condition worsens. Back Pain: Care Instructions Your Care Instructions Back pain has many possible causes. It is often related to problems with muscles and ligaments of the back. It may also be related to problems with the nerves, discs, or bones of the back. Moving, lifting, standing, sitting, or sleeping in an awkward way can strain the back. Sometimes you don't notice the injury until later. Arthritis is another common cause of back pain. Although it may hurt a lot, back pain usually improves on its own within several weeks. Most people recover in 12 weeks or less.  Using good home treatment and being careful not to stress your back can help you feel better sooner. Follow-up care is a key part of your treatment and safety. Be sure to make and go to all appointments, and call your doctor if you are having problems. Its also a good idea to know your test results and keep a list of the medicines you take. How can you care for yourself at home? · Sit or lie in positions that are most comfortable and reduce your pain. Try one of these positions when you lie down: ¨ Lie on your back with your knees bent and supported by large pillows. ¨ Lie on the floor with your legs on the seat of a sofa or chair. Pamella Heller on your side with your knees and hips bent and a pillow between your legs. ¨ Lie on your stomach if it does not make pain worse. · Do not sit up in bed, and avoid soft couches and twisted positions. Bed rest can help relieve pain at first, but it delays healing. Avoid bed rest after the first day of back pain. · Change positions every 30 minutes. If you must sit for long periods of time, take breaks from sitting. Get up and walk around, or lie in a comfortable position. · Try using a heating pad on a low or medium setting for 15 to 20 minutes every 2 or 3 hours. Try a warm shower in place of one session with the heating pad. · You can also try an ice pack for 10 to 15 minutes every 2 to 3 hours. Put a thin cloth between the ice pack and your skin. · Take pain medicines exactly as directed. ¨ If the doctor gave you a prescription medicine for pain, take it as prescribed. ¨ If you are not taking a prescription pain medicine, ask your doctor if you can take an over-the-counter medicine. · Take short walks several times a day. You can start with 5 to 10 minutes, 3 or 4 times a day, and work up to longer walks. Walk on level surfaces and avoid hills and stairs until your back is better. · Return to work and other activities as soon as you can. Continued rest without activity is usually not good for your back. · To prevent future back pain, do exercises to stretch and strengthen your back and stomach. Learn how to use good posture, safe lifting techniques, and proper body mechanics. When should you call for help? Call your doctor now or seek immediate medical care if: 
· You have new or worsening numbness in your legs. · You have new or worsening weakness in your legs. (This could make it hard to stand up.) · You lose control of your bladder or bowels. Watch closely for changes in your health, and be sure to contact your doctor if: 
· Your pain gets worse. · You are not getting better after 2 weeks. Where can you learn more? Go to http://natalie-racquel.info/. Enter R979 in the search box to learn more about \"Back Pain: Care Instructions. \" Current as of: March 21, 2017 Content Version: 11.3 © 6834-6809 [employer]. Care instructions adapted under license by [employer] (which disclaims liability or warranty for this information). If you have questions about a medical condition or this instruction, always ask your healthcare professional. [address] any warranty or liability for your use of this information. Introducing [facility]! Libia Sandoval introduces [email or web address] patient portal. Now you can access parts of your medical record, email your doctor's office, and request medication refills online. 1. In your internet browser, go to [email or web address] 2. Click on the First Time User? Click Here link in the Sign In box. You will see the New Member Sign Up page. 3. Enter your Xango.com Access Code exactly as it appears below. You will not need to use this code after youve completed the sign-up process. If you do not sign up before the expiration date, you must request a new code. · [email or web address] Access Code: [email or web address] Expires: 1/15/2018  2:36 PM 
 
 4. Enter the last four digits of your Social Security Number (xxxx) and Date of Birth (mm/dd/yyyy) as indicated and click Submit. You will be taken to the next sign-up page. 5. Create a iPipeline ID. This will be your iPipeline login ID and cannot be changed, so think of one that is secure and easy to remember. 6. Create a iPipeline password. You can change your password at any time. 7. Enter your Password Reset Question and Answer. This can be used at a later time if you forget your password. 8. Enter your e-mail address. You will receive e-mail notification when new information is available in [address] 9. Click Sign Up. You can now view and download portions of your medical record. 10. Click the Download Summary menu link to download a portable copy of your medical information. If you have questions, please visit the Frequently Asked Questions section of the iPipeline website. Remember, iPipeline is NOT to be used for urgent needs. For medical emergencies, dial 911. Now available from your iPhone and Android! Please provide this summary of care documentation to your next provider. Your primary care clinician is listed as Tiffany Mahan. If you have any questions after today's visit, please call [phone].

## 2017-10-17 NOTE — PROGRESS NOTES
AMBULATORY PROGRESS NOTE      Patient: Felix Bernard             MRN: [id number]     SSN: [id number] Body mass index is 27.19 kg/(m^2). YOB: 1970     AGE: 41 y.o. EX: female    PCP: Miya Merida NP    IMPRESSION/DIAGNOSIS AND TREATMENT PLAN     DIAGNOSES  1. Strain of lumbar region, initial encounter    2. Acute right-sided low back pain with right-sided sciatica        Orders Placed This Encounter    [36562] L/S 2-3 views    REFERRAL TO PHYSICAL THERAPY    metaxalone (SKELAXIN) 800 mg tablet    diclofenac EC (VOLTAREN) 75 mg EC tablet      Felix Bernard understands her diagnoses and the proposed plan. Plan:    1) Referral for Physical Therapy: Low frequency US, Stretching, and Back School. 2) Skelaxin 800 m PO BIDPRN; 60 tablets, 0 refills. 3) Voltaren 75 m PO BID; 60 tablets, 0 refills. RTO - 2 weeks     HPI AND EXAMINATION     Felix Bernard IS A 41 y.o. female who presents to my outpatient office complaining of back pain. The patient The patient reports that the pain started one morning about a month ago. She notes weakness while walking or standing. She mentioned that her right knee and leg feels like it \"wants to give out\". The patient endorses night pain. The patient reports that her  has to help her out of bed at times due to the back pain. She has been given muscle relaxants by her PCP, but she denies any relief. The patient works as a  at [employer]. The patient PCP tried recommending light duty for work but it was denied by [employer]. Appearance: Alert, well appearing and pleasant patient who is in no distress, oriented to person, place/time, and who follows commands. This patient is accompanied in the office by her  self. Gait: slow  Psychiatric: Affect and mood are appropriate.    Cardiovascular/Peripheral Vascular: Normal Pulses to each hand and foot  Gait: normal      Tenderness: moderate tenderness to the right paraspinal muscles        Mild Tenderness to the PSIS  NEURO EXAM :    Negative bilateral Straight leg raise (seated position)   Negative SLR Supine Position both    See Musculoskeletal section for pertinent individual extremity examination   No abnormal hand/wrist, foot/ankle, or facial/neck tremors. REFLEX EXAM: reflexes are normal and symmetric. MOTOR: EXTREMITIES   normal 5/5 strength in all tested muscle groups    SENSORY:   no sensory deficits noted    ROM SPINE:  Decreased lumbar spine ROM    CHART REVIEW     Past Medical History:   Diagnosis Date    Hypertension      Current Outpatient Prescriptions   Medication Sig    metaxalone (SKELAXIN) 800 mg tablet Take 1 Tab by mouth two (2) times daily as needed for Pain.  diclofenac EC (VOLTAREN) 75 mg EC tablet Take 1 Tab by mouth two (2) times daily (with meals).  escitalopram oxalate (LEXAPRO) 10 mg tablet Take 1 Tab by mouth daily. No current facility-administered medications for this visit. No Known Allergies  Past Surgical History:   Procedure Laterality Date    HX TUBAL LIGATION       Social History     Occupational History    Not on file. Social History Main Topics    Smoking status: Never Smoker    Smokeless tobacco: Never Used    Alcohol use No    Drug use: No    Sexual activity: Yes     Partners: Male     Birth control/ protection: Surgical     Family History   Problem Relation Age of Onset    Diabetes Sister     Hypertension Sister     Elevated Lipids Sister     Hypertension Sister     Hypertension Sister     Hypertension Sister     Hypertension Sister     Hypertension Sister        REVIEW OF SYSTEMS : 10/17/2017  ALL BELOW ARE Negative except : SEE HPI       Constitutional: Negative for fever, chills and weight loss. Neg Weigh Loss  Cardiovascular: Negative for chest pain, claudication and leg swelling.  SOB, POMPA   Gastrointestinal: Negative for  pain, N/V/D/C, Blood in stool or urine,dysuria, hematuria, Incontinence, pelvic pain  Musculoskeletal: see HPI. Neurological: Negative for dizziness and weakness. Negative for headaches,Visual Changes, Confusion, Seizures,   Psychiatric/Behavioral: Negative for depression, memory loss and substance abuse. Extremities:  Negative for  hair changes, rash or skin lesion changes. Hematologic: Negative for Bleeding problems, bruising, pallor or swollen lymph nodes. Peripheral Vascular: No calf pain, vascular vein tenderness to calf pain              No calf throbbing, posterior knee throbbing pain    DIAGNOSTIC IMAGING      Dictation on: 10/17/2017  2:20 PM by: Maricruz Tatum [[id number]]         Written by Sebastián Ybarra, as dictated by Otilia Brown MD. I, , Otilia Brown MD, confirm that all documentation is accurate.

## 2017-10-17 NOTE — PROCEDURES
DIAGNOSTIC STUDIES:  X-rays of the lumbar spine, two views, today, [facility] View, AP and lateral.  The lateral radiographic film shows a small bone osteophyte at the inferior anterior endplate of L1, L2, and L3 regions primarily. There is no evidence of spondylolisthesis on this non-dynamic, non-stressed, non-flexion, non-extension view. The AP view shows what appears to be a slight scoliosis to the lumbar spine at the L4 vertebral body. It is hard to appreciate the left-sided lamina on this film, but there is some overlying bowel gas also in this area that does obscure detail in this region. Otherwise, the pedicles are fairly well-maintained throughout the lumbar spine region. The facets show some increased sclerosis, in my opinion, at the L5-S1 and L4-L5 regions.

## 2017-10-17 NOTE — PATIENT INSTRUCTIONS
Please follow up in 2 weeks. You are advised to contact us if your condition worsens. Back Pain: Care Instructions  Your Care Instructions    Back pain has many possible causes. It is often related to problems with muscles and ligaments of the back. It may also be related to problems with the nerves, discs, or bones of the back. Moving, lifting, standing, sitting, or sleeping in an awkward way can strain the back. Sometimes you don't notice the injury until later. Arthritis is another common cause of back pain. Although it may hurt a lot, back pain usually improves on its own within several weeks. Most people recover in 12 weeks or less. Using good home treatment and being careful not to stress your back can help you feel better sooner. Follow-up care is a key part of your treatment and safety. Be sure to make and go to all appointments, and call your doctor if you are having problems. Its also a good idea to know your test results and keep a list of the medicines you take. How can you care for yourself at home? · Sit or lie in positions that are most comfortable and reduce your pain. Try one of these positions when you lie down:  ¨ Lie on your back with your knees bent and supported by large pillows. ¨ Lie on the floor with your legs on the seat of a sofa or chair. Keanu Varghese on your side with your knees and hips bent and a pillow between your legs. ¨ Lie on your stomach if it does not make pain worse. · Do not sit up in bed, and avoid soft couches and twisted positions. Bed rest can help relieve pain at first, but it delays healing. Avoid bed rest after the first day of back pain. · Change positions every 30 minutes. If you must sit for long periods of time, take breaks from sitting. Get up and walk around, or lie in a comfortable position. · Try using a heating pad on a low or medium setting for 15 to 20 minutes every 2 or 3 hours. Try a warm shower in place of one session with the heating pad.   · You can also try an ice pack for 10 to 15 minutes every 2 to 3 hours. Put a thin cloth between the ice pack and your skin. · Take pain medicines exactly as directed. ¨ If the doctor gave you a prescription medicine for pain, take it as prescribed. ¨ If you are not taking a prescription pain medicine, ask your doctor if you can take an over-the-counter medicine. · Take short walks several times a day. You can start with 5 to 10 minutes, 3 or 4 times a day, and work up to longer walks. Walk on level surfaces and avoid hills and stairs until your back is better. · Return to work and other activities as soon as you can. Continued rest without activity is usually not good for your back. · To prevent future back pain, do exercises to stretch and strengthen your back and stomach. Learn how to use good posture, safe lifting techniques, and proper body mechanics. When should you call for help? Call your doctor now or seek immediate medical care if:  · You have new or worsening numbness in your legs. · You have new or worsening weakness in your legs. (This could make it hard to stand up.)  · You lose control of your bladder or bowels. Watch closely for changes in your health, and be sure to contact your doctor if:  · Your pain gets worse. · You are not getting better after 2 weeks. Where can you learn more? Go to http://natalie-racquel.info/. Enter V736 in the search box to learn more about \"Back Pain: Care Instructions. \"  Current as of: March 21, 2017  Content Version: 11.3  © 1262-7369 [employer]. Care instructions adapted under license by [employer] (which disclaims liability or warranty for this information). If you have questions about a medical condition or this instruction, always ask your healthcare professional. [address] any warranty or liability for your use of this information.

## 2017-10-23 ENCOUNTER — APPOINTMENT (OUTPATIENT)
Dept: PHYSICAL THERAPY | Age: 47
End: 2017-10-23
Payer: COMMERCIAL

## 2017-10-25 ENCOUNTER — HOSPITAL ENCOUNTER (OUTPATIENT)
Dept: PHYSICAL THERAPY | Age: 47
Discharge: HOME | End: 2017-10-25
Payer: COMMERCIAL

## 2017-10-25 PROCEDURE — 97110 THERAPEUTIC EXERCISES: CPT | Performed by: PHYSICAL THERAPIST

## 2017-10-25 PROCEDURE — 97140 MANUAL THERAPY 1/> REGIONS: CPT | Performed by: PHYSICAL THERAPIST

## 2017-10-25 PROCEDURE — 97161 PT EVAL LOW COMPLEX 20 MIN: CPT | Performed by: PHYSICAL THERAPIST

## 2017-10-25 NOTE — PROGRESS NOTES
[facility] - [facility]              [address], [address] [place]   [phone] fax    Plan of Care/ Statement of Necessity for Physical Therapy Services  Patient name: Fernie Moore Start of Care: 10/25/2017   Referral source: Cassidy Gates MD : 1970    Medical Diagnosis: Lumbago with sciatica, right side [M54.41]   Onset Date:~ 1 month ago    Treatment Diagnosis: R sciatica   Prior Hospitalization: see medical history Provider#: [id number]   Medications: Verified on Patient summary List    Comorbidities: anxiety/panic disorder, back pain, depression, HA, HBP, sleep dysfunction   Prior Level of Function: Independent w/ ADLs and work tasks, no back pain      The Plan of Care and following information is based on the information from the initial evaluation. Assessment/ key information: Pt is a 42 y/o female w/ c/o increased pain in the low back and shooting down the R LE w/ numbness present at night. Reports this began ~1 month ago w/o specific injury but notes she had to move heavy racks of clothing at work the day before. Reports pain increases w/ standing and bending and decreases w/ heat and rest. Denies any previous injury to her back. States she completed a round of steroids and mm relaxors but those did not help. Reports she is not able to perform light duty at work and has to leave if the pain gets to be too much. Pt presents w/ unremarkable gait. Lumbar AROM is WFL, decreased lumbar reversal w/ FF, an dpain at [facility] ext reported. MMT R hip flex 4/5, abd -4/5, ext 3+/5, Gmax 3/5, knee flex -4/5, ext 4/5; L LE grossly 4-4+/5. Unable to SLS R w/o 1 HHA 2' pain. Increased mm tightness noted in B QL, R piriformis, gluts, and hip ER's. Level pelvic alignment noted, increased pain w/ SIJ provocation B R>L. + slump R, + SLR B, - 90/90 B, + ely's R>L, pain w/ supine bridge.  Pt will benefit from skilled PT to address the deficits and progress with pt goals. Evaluation Complexity History MEDIUM  Complexity : 1-2 comorbidities / personal factors will impact the outcome/ POC ; Examination MEDIUM Complexity : 3 Standardized tests and measures addressing body structure, function, activity limitation and / or participation in recreation  ;Presentation MEDIUM Complexity : Evolving with changing characteristics  ; Clinical Decision Making MEDIUM Complexity : FOTO score of 26-74  Overall Complexity Rating: MEDIUM  Problem List: pain affecting function, decrease ROM, decrease strength, impaired gait/ balance, decrease ADL/ functional abilitiies, decrease activity tolerance, decrease flexibility/ joint mobility and decrease transfer abilities   Treatment Plan may include any combination of the following: Therapeutic exercise, Therapeutic activities, Neuromuscular re-education, Physical agent/modality, Gait/balance training, Manual therapy, Patient education and Functional mobility training  Patient / Family readiness to learn indicated by: asking questions, trying to perform skills and interest  Persons(s) to be included in education: patient (P)  Barriers to Learning/Limitations: None  Patient Goal (s): my pain get better  Patient Self Reported Health Status: fair  Rehabilitation Potential: good    Short Term Goals: To be accomplished in 1 weeks:  1. Pt will be independent and complaint w/ HEP to progress gains in PT. Long Term Goals: To be accomplished in 6 weeks:  1. Pt will improve FOTO to > or = to 53 to demo improved function. 2. Pt will improve lumbar AROM to WNL and pain free for ease w/ return to unrestricted work tasks. 3. Pt will increase MMT R LE to > or = to 4-4+/5 grossly for ease w/ return to pain free ADLs. 4. Pt will improve neural tension as demo'd by - slump R for improve ease w/ lifting at work. Frequency / Duration: Patient to be seen 2 times per week for 6 weeks.     Patient/ Caregiver education and instruction: Diagnosis, prognosis, activity modification and exercises   [x]  Plan of care has been reviewed with EVAN Andrade, PT 10/25/2017 1:00 PM  ________________________________________________________________________    I certify that the above Therapy Services are being furnished while the patient is under my care. I agree with the treatment plan and certify that this therapy is necessary.     [de-identified] Signature:____________________  Date:____________Time: _________    Please sign and return to [facility] - [address]        [place], [address] [place]   [phone] fax

## 2017-10-25 NOTE — PROGRESS NOTES
PT DAILY TREATMENT NOTE     Patient Name: Marlo Whitten  Date:10/25/2017  : 1970  [x]  Patient  Verified  Payor: [employer] / Plan: Kayla Hernandez [id number] PPO / Product Type: PPO /    In time:1134  Out time:1208  Total Treatment Time (min): 34  Visit #: 1 of 12    Treatment Area: Lumbago with sciatica, right side [M54.41]    SUBJECTIVE  Pain Level (0-10 scale): 9/10 w/ motrin  Any medication changes, allergies to medications, adverse drug reactions, diagnosis change, or new procedure performed?: [x] No    [] Yes (see summary sheet for update)  Subjective functional status/changes:   [] No changes reported  HPI: Pt c/o increased pain in the low back and shooting down the R LE w/ numbness present at night. Reports this began ~1 month ago w/o specific injury but notes she had to move heavy racks of clothing at work the day before. Reports pain increases w/ standing and bending and decreases w/ heat and rest. Denies any previous injury to her back. States she completed a round of steroids and mm relaxors but those did not help. Reports she is not able to perform light duty at work and has to leave if the pain gets to be too much.      OBJECTIVE    Modality rationale:  to improve the patients ability to    Min Type Additional Details    [] Estim:  []Unatt       []IFC  []Premod                        []Other:  []w/ice   []w/heat  Position:  Location:    [] Estim: []Att    []TENS instruct  []NMES                    []Other:  []w/US   []w/ice   []w/heat  Position:  Location:    []  Traction: [] Cervical       []Lumbar                       [] Prone          []Supine                       []Intermittent   []Continuous Lbs:  [] before manual  [] after manual    []  Ultrasound: []Continuous   [] Pulsed                           []1MHz   []3MHz W/cm2:  Location:    []  Iontophoresis with dexamethasone         Location: [] Take home patch   [] In clinic    []  Ice     []  heat  []  Ice massage  []  Laser []  Anodyne Position:  Location:    []  Laser with stim  []  Other:  Position:  Location:    []  Vasopneumatic Device Pressure:       [] lo [] med [] hi   Temperature: [] lo [] med [] hi   [] Skin assessment post-treatment:  []intact []redness- no adverse reaction    []redness - adverse reaction:     14 min [x]Eval                  []Re-Eval       12 min Therapeutic Exercise:  [] See flow sheet : instructed in and demo'd HEP, educated on core activation and TPR release w/ tennis ball   Rationale: increase strength, improve coordination and increase proprioception to improve the patients ability to decrease pain and improve activity tolerance      min Therapeutic Activity:  []  See flow sheet :   Rationale:   to improve the patients ability to       min Neuromuscular Re-education:  []  See flow sheet :   Rationale:   to improve the patients ability to     8 min Manual Therapy:  TPR/DTM to B ql, R piriformis, gluts, and hip ER's, sacral mobs   Rationale: decrease pain, increase ROM, increase tissue extensibility and decrease trigger points to improve activity tolerance and mobility      min Gait Training:  ___ feet with ___ device on level surfaces with ___ level of assist   Rationale: With   [] TE   [] TA   [] neuro   [] other: Patient Education: [x] Review HEP    [] Progressed/Changed HEP based on:   [] positioning   [] body mechanics   [] transfers   [] heat/ice application    [] other:      Other Objective/Functional Measures: Pt presents w/ unremarkable gait. Lumbar AROM is WFL, decreased lumbar reversal w/ FF, an [facility]pain at [facility] ext reported. MMT R hip flex 4/5, abd -4/5, ext 3+/5, Gmax 3/5, knee flex -4/5, ext 4/5; L LE grossly 4-4+/5. Unable to SLS R w/o 1 HHA 2' pain. Increased mm tightness noted in B QL, R piriformis, gluts, and hip ER's. Level pelvic alignment noted, increased pain w/ SIJ provocation B R>L. + slump R, + SLR B, - 90/90 B, + ely's R>L, pain w/ supine bridge.      Pain Level (0-10 scale) post treatment: 7/10    ASSESSMENT/Changes in Function: Pt w/ relief after session. Educated in tennis ball TRP for self management while at work. Will educate on lifting mechanics to decrease pain at work. Focus on relieving pain and pressure around and on the sciatic nerve and improve activity tolerance. Patient will continue to benefit from skilled PT services to modify and progress therapeutic interventions, address functional mobility deficits, address ROM deficits, address strength deficits, analyze and address soft tissue restrictions, analyze and cue movement patterns, analyze and modify body mechanics/ergonomics, address imbalance/dizziness and instruct in home and community integration to attain remaining goals. [x]  See Plan of Care  []  See progress note/recertification  []  See Discharge Summary         Progress towards goals / Updated goals:  Short Term Goals: To be accomplished in 1 weeks:  1. Pt will be independent and complaint w/ HEP to progress gains in PT. At eval: initiated HEP  Long Term Goals: To be accomplished in 6 weeks:  1. Pt will improve FOTO to > or = to 53 to demo improved function. At [facility]: FOTO = 33  2. Pt will improve lumbar AROM to WNL and pain free for ease w/ return to unrestricted work tasks. At [facility]: Lumbar AROM is WFL, decreased lumbar reversal w/ FF, and pain at [facility] [employer] ext reported  3. Pt will increase MMT R LE to > or = to 4-4+/5 grossly for ease w/ return to pain free ADLs. At eval:MMT R hip flex 4/5, abd -4/5, ext 3+/5, Gmax 3/5, knee flex -4/5, ext 4/5   4. Pt will improve neural tension as demo'd by - slump R for improve ease w/ lifting at work.    At eval: (+) slump R    PLAN  []  Upgrade activities as tolerated     []  Continue plan of care  []  Update interventions per flow sheet       []  Discharge due to:_  [x]  Other: 2x/6 weeks      Maddison Hooper, PT 10/25/2017  12:50 PM    Future Appointments  Date Time Provider Erasmo Beltran 10/27/2017 10:00 AM Pavel Paniagua, PTA [facility]

## 2017-10-27 ENCOUNTER — APPOINTMENT (OUTPATIENT)
Dept: PHYSICAL THERAPY | Age: 47
End: 2017-10-27
Payer: COMMERCIAL

## 2017-11-01 ENCOUNTER — DOCUMENTATION ONLY (OUTPATIENT)
Dept: ORTHOPEDIC SURGERY | Age: 47
End: 2017-11-01

## 2017-11-02 ENCOUNTER — APPOINTMENT (OUTPATIENT)
Dept: PHYSICAL THERAPY | Age: 47
End: 2017-11-02

## 2017-11-03 ENCOUNTER — APPOINTMENT (OUTPATIENT)
Dept: PHYSICAL THERAPY | Age: 47
End: 2017-11-03

## 2017-11-06 ENCOUNTER — OFFICE VISIT (OUTPATIENT)
Dept: ORTHOPEDIC SURGERY | Age: 47
End: 2017-11-06

## 2017-11-06 VITALS
HEART RATE: 96 BPM | SYSTOLIC BLOOD PRESSURE: 132 MMHG | OXYGEN SATURATION: 99 % | BODY MASS INDEX: 27.31 KG/M2 | TEMPERATURE: 97.8 F | HEIGHT: 64 IN | WEIGHT: 160 LBS | DIASTOLIC BLOOD PRESSURE: 84 MMHG

## 2017-11-06 DIAGNOSIS — M54.41 ACUTE RIGHT-SIDED LOW BACK PAIN WITH RIGHT-SIDED SCIATICA: Primary | ICD-10-CM

## 2017-11-06 DIAGNOSIS — S39.012A STRAIN OF LUMBAR REGION, INITIAL ENCOUNTER: ICD-10-CM

## 2017-11-06 RX ORDER — ACETAMINOPHEN AND CODEINE PHOSPHATE 300; 30 MG/1; MG/1
1 TABLET ORAL
Qty: 42 TAB | Refills: 0 | Status: ON HOLD | OUTPATIENT
Start: 2017-11-06 | End: 2018-01-16

## 2017-11-06 RX ORDER — PREDNISONE 20 MG/1
TABLET ORAL
COMMUNITY
End: 2018-04-09 | Stop reason: ALTCHOICE

## 2017-11-06 RX ORDER — CYCLOBENZAPRINE HCL 10 MG
10 TABLET ORAL
Qty: 60 TAB | Refills: 0 | Status: SHIPPED | OUTPATIENT
Start: 2017-11-06

## 2017-11-06 RX ORDER — AMLODIPINE BESYLATE 2.5 MG/1
TABLET ORAL DAILY
COMMUNITY

## 2017-11-06 NOTE — PROGRESS NOTES
Patient: Henrik Muhammad                MRN: [id number]       SSN: [id number]  YOB: 1970                    AGE: 41 y.o. SEX: female    Trung Paniagua MD    DOI: 10/23/17    Chief Complaint:   Chief Complaint   Patient presents with    Back Pain     lower         HPI AND PHYSICAL EXAM:     Henrik Muhammad is a 41 y.o. female who presents today for evaluation of lower back pain. Patient was last seen in the office on 10/17/2017 by Dr. Mariana Evans. The patient's prior history is detailed in the previous encounter. At the last visit, the patient's plan was as follows:    Plan:  1) Referral for Physical Therapy: Low frequency US, Stretching, and Back School. 2) Skelaxin 800 m PO BIDPRN; 60 tablets, 0 refills. 3) Voltaren 75 m PO BID; 60 tablets, 0 refills. 4) RTO - 2 weeks     She had excruciating pain with PT and could not continue. She states that her pain is getting worse and she has radiculopathy down both legs. She cannot bend flex or extend at the waist. She has been taking Motrin for her pain and states that the Mobic did not work. She works as  at [employer] and states that she is not sure if she injured her back at work or not. She states that she does a lot of pushing and pulling (and she does not wear a belt). She states that her pain did not start at work. She state that it started at home (on a Monday night approx 2 weeks ago) when she noticed a twing of pain prior to going to bed and states that at approx. 5:00 a.m. She woke up in excruciating pain. She denies any history of prior back problems, fall or trauma. Visit Vitals    /84    Pulse 96    Temp 97.8 °F (36.6 °C)    Ht 5' 4\" (1.626 m)    Wt 160 lb (72.6 kg)    SpO2 99%    BMI 27.46 kg/m2     Pain Scale: 10 - Worst pain ever/10      The patient works as a  at [employer]. The patient PCP tried recommending light duty for work but it was denied by [employer]. Appearance: Alert, well appearing and pleasant patient who is in no distress, oriented to person, place/time, and who follows commands. This patient is accompanied in the office by her  self. Gait: slow  Psychiatric: Affect and mood are appropriate. Cardiovascular/Peripheral Vascular: Normal Pulses to each hand and foot  Gait: normal      Tenderness: moderate tenderness to the right paraspinal muscles        Mild Tenderness to the PSIS  NEURO EXAM :    Negative bilateral Straight leg raise (seated position)   Negative SLR Supine Position both    See Musculoskeletal section for pertinent individual extremity examination   No abnormal hand/wrist, foot/ankle, or facial/neck tremors. REFLEX EXAM: reflexes are normal and symmetric. MOTOR: EXTREMITIES   normal 5/5 strength in all tested muscle groups    SENSORY:   no sensory deficits noted    ROM SPINE:  Decreased lumbar spine ROM    IMPRESSION:     1. Acute right-sided low back pain with right-sided sciatica    2. Strain of lumbar region, initial encounter        PLAN:         Orders Placed This Encounter    MRI LUMB SPINE WO CONT    cyclobenzaprine (FLEXERIL) 10 mg tablet    acetaminophen-codeine (TYLENOL-CODEINE #3) 300-30 mg per tablet               Patient Instructions             MRI of lumbar spine ordered  Continue activity modification  Flexeril has been prescribed. Please start this medication at night time as it may cause drowsiness  Rx provided for Ultram for severe pain  Please follow up after MRI completed         MRI of the Lumbar Spine: About This Test  What is it? MRI (magnetic resonance imaging) is a test that uses a magnetic field and pulses of radio wave energy to make pictures of the organs and structures inside the body. An MRI can give your doctor information about the spine in your lower back (the lumbar spine). This can include the spine, the space around the spinal cord, and the vertebrae in your lower back.   When you have an MRI, you lie on a table that moves into the MRI machine. Why is this test done? An MRI of the lumbar spine can help find the cause of symptoms like back pain or leg pain, weakness, or numbness. It can help find problems such as a herniated disc, a tumor, or an infection. How can you prepare for the test?  Talk to your doctor about all your health conditions before the test. For example, tell your doctor if:  · You are allergic to any medicines. · You are or might be pregnant. · You have a pacemaker, an artificial limb, any metal pins or metal parts in your body, metal heart valves, metal clips in your brain, metal implants in your ears, or any other implanted or prosthetic medical device. · You have an intrauterine device (IUD) in place. · You get nervous in confined spaces. You may need medicine to help you relax. · You wear a patch that contains medicine. · You have kidney disease. What happens before the test?  · You will remove all metal objects, such as hearing aids, dentures, jewelry, watches, and hairpins. · You will take off all or most of your clothes and change into a gown. · You may have contrast material (dye) put into your arm through a tube called an IV. Contrast material helps doctors see specific organs, blood vessels, and most tumors. What happens during the test?  · You will lie on your back on a table that is part of the MRI scanner. Your head, chest, and arms may be held with straps to help you stay still. · The table will slide into the space that contains the magnet. · Inside the scanner you will hear a fan and feel air moving. You may hear tapping, thumping, or snapping noises. You may be given earplugs or headphones to reduce the noise. · You will be asked to hold still during the scan. You may be asked to hold your breath for short periods.   · You may be alone in the scanning room, but a technologist will watch you through a window and talk with you during the test.  What else should you know about the test?  · An MRI does not hurt. · If a dye is used, you may feel a quick sting or pinch and some coolness when the IV is started. Some people feel sick to their stomach or get a headache. · If you breastfeed and are concerned about whether the dye used in this test is safe, talk to your doctor. Most experts believe that very little dye passes into breast milk and even less is passed on to the baby. But if you prefer, you can store some of your breast milk ahead of time and use it for a day or two after the test.  · You may feel warmth in the area being examined. This is normal.  How long does the test take? · The test usually takes 30 to 60 minutes. What happens after the test?  · You will probably be able to go home right away, depending on the reason for the test.  · You can go back to your usual activities right away. Follow-up care is a key part of your treatment and safety. Be sure to make and go to all appointments, and call your doctor if you are having problems. It's also a good idea to keep a list of the medicines you take. Ask your doctor when you can expect to have your test results. Where can you learn more? Go to http://natalie-racquel.info/. Enter Q783 in the search box to learn more about \"MRI of the Lumbar Spine: About This Test.\"  Current as of: October 14, 2016  Content Version: 11.4  © 7361-5617 [employer]. Care instructions adapted under license by [employer] (which disclaims liability or warranty for this information). If you have questions about a medical condition or this instruction, always ask your healthcare professional. [place] [address] any warranty or liability for your use of this information.                Patient understands treatment plan and has been provided with patient education.         PAST MEDICAL HISTORY:     Past Medical History:   Diagnosis Date    Hypertension MEDICATIONS:     Current Outpatient Prescriptions   Medication Sig    amLODIPine (NORVASC) 2.5 mg tablet Take  by mouth daily.  predniSONE (DELTASONE) 20 mg tablet Take  by mouth daily (with breakfast).  cyclobenzaprine (FLEXERIL) 10 mg tablet Take 1 Tab by mouth three (3) times daily as needed for Muscle Spasm(s).  acetaminophen-codeine (TYLENOL-CODEINE #3) 300-30 mg per tablet Take 1 Tab by mouth every four (4) hours as needed for Pain. Max Daily Amount: 6 Tabs.  diclofenac EC (VOLTAREN) 75 mg EC tablet Take 1 Tab by mouth two (2) times daily (with meals).  escitalopram oxalate (LEXAPRO) 10 mg tablet Take 1 Tab by mouth daily. No current facility-administered medications for this visit. ALLERGIES:     No Known Allergies      PAST SURGICAL HISTORY:     Past Surgical History:   Procedure Laterality Date    HX TUBAL LIGATION         SOCIAL HISTORY:     Social History     Social History    Marital status:      Spouse name: N/A    Number of children: N/A    Years of education: N/A     Occupational History    Not on file. Social History Main Topics    Smoking status: Never Smoker    Smokeless tobacco: Never Used    Alcohol use No    Drug use: No    Sexual activity: Yes     Partners: Male     Birth control/ protection: Surgical     Other Topics Concern    Not on file     Social History Narrative       FAMILY HISTORY:     Family History   Problem Relation Age of Onset    Diabetes Sister     Hypertension Sister     Elevated Lipids Sister     Hypertension Sister     Hypertension Sister     Hypertension Sister     Hypertension Sister     Hypertension Sister          REVIEW OF SYSTEMS:     Otherwise as noted in HPI      RADIOGRAPHS & DIAGNOSTIC STUDIES     No results found for any visits on 11/06/17.     DIAGNOSTIC STUDIES:  X-rays of the lumbar spine, two views, on 10-18-17, [facility] View, AP and lateral.  The lateral radiographic film shows a small bone osteophyte at the inferior anterior endplate of L1, L2, and L3 regions primarily. There is no evidence of spondylolisthesis on this non-dynamic, non-stressed, non-flexion, non-extension view. The AP view shows what appears to be a slight scoliosis to the lumbar spine at the L4 vertebral body. It is hard to appreciate the left-sided lamina on this film, but there is some overlying bowel gas also in this area that does obscure detail in this region. Otherwise, the pedicles are fairly well-maintained throughout the lumbar spine region. The facets show some increased sclerosis, in my opinion, at the L5-S1 and L4-L5 regions.        Aung Patel PA-C  11/6/2017

## 2017-11-06 NOTE — MR AVS SNAPSHOT
Visit Information Date & Time Provider Department Dept. Phone Encounter #  
 11/6/2017  8:45 AM Morris Horner, [facility] [phone] [id number] Follow-up Instructions Return in about 2 weeks (around 11/20/2017) for follow up evaluation. Upcoming Health Maintenance Date Due DTaP/Tdap/Td series (1 - Tdap) 8/5/1993 PAP AKA CERVICAL CYTOLOGY 8/5/1993 INFLUENZA AGE 9 TO ADULT 8/1/2017 Allergies as of 11/6/2017  Review Complete On: 11/6/2017 By: Morris Horner PA-C No Known Allergies Current Immunizations  Never Reviewed No immunizations on file. Not reviewed this visit You Were Diagnosed With   
  
 Codes Comments Acute right-sided low back pain with right-sided sciatica    -  Primary ICD-10-CM: M54.41 
ICD-9-CM: 724.2, 724.3 Strain of lumbar region, initial encounter     ICD-10-CM: S39.012A ICD-9-CM: 433. 2 Vitals BP Pulse Temp Height(growth percentile) Weight(growth percentile) SpO2  
 132/84 96 97.8 °F (36.6 °C) 5' 4\" (1.626 m) 160 lb (72.6 kg) 99% BMI Smoking Status 27.46 kg/m2 Never Smoker BMI and BSA Data Body Mass Index Body Surface Area  
 27.46 kg/m 2 1.81 m 2 Preferred Pharmacy Pharmacy Name Phone [facility] [address], [address] [facility] Your Updated Medication List  
  
   
This list is accurate as of: 11/6/17  9:10 AM.  Always use your most recent med list.  
  
  
  
  
 acetaminophen-codeine 300-30 mg per tablet Commonly known as:  TYLENOL-CODEINE #3 Take 1 Tab by mouth every four (4) hours as needed for Pain. Max Daily Amount: 6 Tabs. amLODIPine 2.5 mg tablet Commonly known as:  Ansley Croon Take  by mouth daily. cyclobenzaprine 10 mg tablet Commonly known as:  FLEXERIL Take 1 Tab by mouth three (3) times daily as needed for Muscle Spasm(s). diclofenac EC 75 mg EC tablet Commonly known as:  VOLTAREN Take 1 Tab by mouth two (2) times daily (with meals). escitalopram oxalate 10 mg tablet Commonly known as:  Eugromel Silvia Take 1 Tab by mouth daily. predniSONE 20 mg tablet Commonly known as:  Ann Omer Take  by mouth daily (with breakfast). Prescriptions Printed Refills  
 cyclobenzaprine (FLEXERIL) 10 mg tablet 0 Sig: Take 1 Tab by mouth three (3) times daily as needed for Muscle Spasm(s). Class: Print Route: Oral  
 acetaminophen-codeine (TYLENOL-CODEINE #3) 300-30 mg per tablet 0 Sig: Take 1 Tab by mouth every four (4) hours as needed for Pain. Max Daily Amount: 6 Tabs. Class: Print Route: Oral  
  
Follow-up Instructions Return in about 2 weeks (around 11/20/2017) for follow up evaluation. To-Do List   
 11/07/2017 9:30 AM  
  Appointment with Priscilla Anand at [facility]  [facility] ([phone]) 11/09/2017 8:00 AM  
  Appointment with Verena Bennett PT at [facility]  [facility] ([phone])  
  
 11/13/2017 Imaging:  MRI LUMB SPINE WO CONT Referral Information Referral ID Referred By Referred To  
  
 [id number] Cecille FABIAN Not Available Visits Status Start Date End Date 1 New Request 11/6/17 11/6/18 If your referral has a status of pending review or denied, additional information will be sent to support the outcome of this decision. Patient Instructions MRI of lumbar spine ordered Continue activity modification Flexeril has been prescribed. Please start this medication at night time as it may cause drowsiness Rx provided for Ultram for severe pain Please follow up after MRI completed MRI of the Lumbar Spine: About This Test 
What is it? MRI (magnetic resonance imaging) is a test that uses a magnetic field and pulses of radio wave energy to make pictures of the organs and structures inside the body.  An MRI can give your doctor information about the spine in your lower back (the lumbar spine). This can include the spine, the space around the spinal cord, and the vertebrae in your lower back. When you have an MRI, you lie on a table that moves into the MRI machine. Why is this test done? An MRI of the lumbar spine can help find the cause of symptoms like back pain or leg pain, weakness, or numbness. It can help find problems such as a herniated disc, a tumor, or an infection. How can you prepare for the test? 
Talk to your doctor about all your health conditions before the test. For example, tell your doctor if: 
· You are allergic to any medicines. · You are or might be pregnant. · You have a pacemaker, an artificial limb, any metal pins or metal parts in your body, metal heart valves, metal clips in your brain, metal implants in your ears, or any other implanted or prosthetic medical device. · You have an intrauterine device (IUD) in place. · You get nervous in confined spaces. You may need medicine to help you relax. · You wear a patch that contains medicine. · You have kidney disease. What happens before the test? 
· You will remove all metal objects, such as hearing aids, dentures, jewelry, watches, and hairpins. · You will take off all or most of your clothes and change into a gown. · You may have contrast material (dye) put into your arm through a tube called an IV. Contrast material helps doctors see specific organs, blood vessels, and most tumors. What happens during the test? 
· You will lie on your back on a table that is part of the MRI scanner. Your head, chest, and arms may be held with straps to help you stay still. · The table will slide into the space that contains the magnet. · Inside the scanner you will hear a fan and feel air moving. You may hear tapping, thumping, or snapping noises. You may be given earplugs or headphones to reduce the noise. · You will be asked to hold still during the scan.  You may be asked to hold your breath for short periods. · You may be alone in the scanning room, but a technologist will watch you through a window and talk with you during the test. 
What else should you know about the test? 
· An MRI does not hurt. · If a dye is used, you may feel a quick sting or pinch and some coolness when the IV is started. Some people feel sick to their stomach or get a headache. · If you breastfeed and are concerned about whether the dye used in this test is safe, talk to your doctor. Most experts believe that very little dye passes into breast milk and even less is passed on to the baby. But if you prefer, you can store some of your breast milk ahead of time and use it for a day or two after the test. 
· You may feel warmth in the area being examined. This is normal. 
How long does the test take? · The test usually takes 30 to 60 minutes. What happens after the test? 
· You will probably be able to go home right away, depending on the reason for the test. 
· You can go back to your usual activities right away. Follow-up care is a key part of your treatment and safety. Be sure to make and go to all appointments, and call your doctor if you are having problems. It's also a good idea to keep a list of the medicines you take. Ask your doctor when you can expect to have your test results. Where can you learn more? Go to http://natalie-racquel.info/. Enter G040 in the search box to learn more about \"MRI of the Lumbar Spine: About This Test.\" Current as of: October 14, 2016 Content Version: 11.4 © 3280-8754 [employer]. Care instructions adapted under license by [employer] (which disclaims liability or warranty for this information). If you have questions about a medical condition or this instruction, always ask your healthcare professional. [address] any warranty or liability for your use of this information. Introducing [facility]! Charity Alfaro introduces [employer] patient portal. Now you can access parts of your medical record, email your doctor's office, and request medication refills online. 1. In your internet browser, go to [email or web address] 2. Click on the First Time User? Click Here link in the Sign In box. You will see the New Member Sign Up page. 3. Enter your [employer] Access Code exactly as it appears below. You will not need to use this code after youve completed the sign-up process. If you do not sign up before the expiration date, you must request a new code. · [employer] Access Code: [email or web address] Expires: 1/15/2018  1:36 PM 
 
4. Enter the last four digits of your Social Security Number (xxxx) and Date of Birth (mm/dd/yyyy) as indicated and click Submit. You will be taken to the next sign-up page. 5. Create a [employer] ID. This will be your [employer] login ID and cannot be changed, so think of one that is secure and easy to remember. 6. Create a [employer] password. You can change your password at any time. 7. Enter your Password Reset Question and Answer. This can be used at a later time if you forget your password. 8. Enter your e-mail address. You will receive e-mail notification when new information is available in [address] 9. Click Sign Up. You can now view and download portions of your medical record. 10. Click the Download Summary menu link to download a portable copy of your medical information. If you have questions, please visit the Frequently Asked Questions section of the [employer] website. Remember, [employer] is NOT to be used for urgent needs. For medical emergencies, dial 911. Now available from your iPhone and Android! Please provide this summary of care documentation to your next provider. Your primary care clinician is listed as Jonathan Bunn. If you have any questions after today's visit, please call [phone].

## 2017-11-06 NOTE — PATIENT INSTRUCTIONS
MRI of lumbar spine ordered  Continue activity modification  Flexeril has been prescribed. Please start this medication at night time as it may cause drowsiness  Rx provided for Ultram for severe pain  Please follow up after MRI completed         MRI of the Lumbar Spine: About This Test  What is it? MRI (magnetic resonance imaging) is a test that uses a magnetic field and pulses of radio wave energy to make pictures of the organs and structures inside the body. An MRI can give your doctor information about the spine in your lower back (the lumbar spine). This can include the spine, the space around the spinal cord, and the vertebrae in your lower back. When you have an MRI, you lie on a table that moves into the MRI machine. Why is this test done? An MRI of the lumbar spine can help find the cause of symptoms like back pain or leg pain, weakness, or numbness. It can help find problems such as a herniated disc, a tumor, or an infection. How can you prepare for the test?  Talk to your doctor about all your health conditions before the test. For example, tell your doctor if:  · You are allergic to any medicines. · You are or might be pregnant. · You have a pacemaker, an artificial limb, any metal pins or metal parts in your body, metal heart valves, metal clips in your brain, metal implants in your ears, or any other implanted or prosthetic medical device. · You have an intrauterine device (IUD) in place. · You get nervous in confined spaces. You may need medicine to help you relax. · You wear a patch that contains medicine. · You have kidney disease. What happens before the test?  · You will remove all metal objects, such as hearing aids, dentures, jewelry, watches, and hairpins. · You will take off all or most of your clothes and change into a gown. · You may have contrast material (dye) put into your arm through a tube called an IV.  Contrast material helps doctors see specific organs, blood vessels, and most tumors. What happens during the test?  · You will lie on your back on a table that is part of the MRI scanner. Your head, chest, and arms may be held with straps to help you stay still. · The table will slide into the space that contains the magnet. · Inside the scanner you will hear a fan and feel air moving. You may hear tapping, thumping, or snapping noises. You may be given earplugs or headphones to reduce the noise. · You will be asked to hold still during the scan. You may be asked to hold your breath for short periods. · You may be alone in the scanning room, but a technologist will watch you through a window and talk with you during the test.  What else should you know about the test?  · An MRI does not hurt. · If a dye is used, you may feel a quick sting or pinch and some coolness when the IV is started. Some people feel sick to their stomach or get a headache. · If you breastfeed and are concerned about whether the dye used in this test is safe, talk to your doctor. Most experts believe that very little dye passes into breast milk and even less is passed on to the baby. But if you prefer, you can store some of your breast milk ahead of time and use it for a day or two after the test.  · You may feel warmth in the area being examined. This is normal.  How long does the test take? · The test usually takes 30 to 60 minutes. What happens after the test?  · You will probably be able to go home right away, depending on the reason for the test.  · You can go back to your usual activities right away. Follow-up care is a key part of your treatment and safety. Be sure to make and go to all appointments, and call your doctor if you are having problems. It's also a good idea to keep a list of the medicines you take. Ask your doctor when you can expect to have your test results. Where can you learn more? Go to http://natalie-racquel.info/.   Enter S543 in the search box to learn more about \"MRI of the Lumbar Spine: About This Test.\"  Current as of: October 14, 2016  Content Version: 11.4  © 0639-1406 Healthwise, Incorporated. Care instructions adapted under license by [employer] (which disclaims liability or warranty for this information). If you have questions about a medical condition or this instruction, always ask your healthcare professional. [address] any warranty or liability for your use of this information.

## 2017-11-13 ENCOUNTER — DOCUMENTATION ONLY (OUTPATIENT)
Dept: ORTHOPEDIC SURGERY | Age: 47
End: 2017-11-13

## 2017-11-13 NOTE — PROGRESS NOTES
Severo Smith Disability form completed & faxed to [phone] per Patient's request.  Patient called to  at [facility] [address] for her records.

## 2017-11-15 ENCOUNTER — TELEPHONE (OUTPATIENT)
Dept: ORTHOPEDIC SURGERY | Age: 47
End: 2017-11-15

## 2017-11-15 NOTE — LETTER
NOTIFICATION RETURN TO WORK / SCHOOL 
 
11/15/2017 4:06 PM 
 
Ms. Faustino La [address] [place] [id number] [address] To Whom It May Concern: 
 
Faustino La is currently under the care of [address] [place], [place] [address]glendy Tatumd. She will remain out of work until 11-27-17. If there are questions or concerns please have the patient contact our office.  
 
 
 
Sincerely, 
 
 
Milly Israel MD

## 2017-11-15 NOTE — TELEPHONE ENCOUNTER
Patient can have work note to continue out of work until 11/27/17.      Park Quinonez PA-C  11/15/2017   4:05 PM

## 2017-11-15 NOTE — TELEPHONE ENCOUNTER
PATIENT CALLED FOR EITHER DR. HILL OR MOHSEN VANESSA. PATIENT SAID THAT HER JOB TOLD HER THAT THE COPY OF THE [employer] DISABILITY FORM STATES THAT SHE IS SUPPOSED TO RETURN TO WORK ON 11/22/2017. PATIENT SAID THAT WHEN  SHE WAS LAST SEEN  ON 11/06/17 BY MOHSEN VANESSA THAT SHE WAS TOLD SHE IS NOT TO RETURN TO WORK UNTIL 11/27/2017      PATIENT WOULD LIKE A CALL BACK IN REGARDS TO THIS. PATIENT TEL. [phone].

## 2017-11-15 NOTE — TELEPHONE ENCOUNTER
Spoke with patient and informed her that her work note is ready to be picked up at any of our office locations.

## 2017-11-17 ENCOUNTER — HOSPITAL ENCOUNTER (OUTPATIENT)
Age: 47
Discharge: HOME | End: 2017-11-17
Attending: PHYSICIAN ASSISTANT
Payer: COMMERCIAL

## 2017-11-17 DIAGNOSIS — M54.41 ACUTE RIGHT-SIDED LOW BACK PAIN WITH RIGHT-SIDED SCIATICA: ICD-10-CM

## 2017-11-17 DIAGNOSIS — S39.012A STRAIN OF LUMBAR REGION, INITIAL ENCOUNTER: ICD-10-CM

## 2017-11-17 PROCEDURE — 72148 MRI LUMBAR SPINE W/O DYE: CPT

## 2017-11-20 ENCOUNTER — OFFICE VISIT (OUTPATIENT)
Dept: ORTHOPEDIC SURGERY | Age: 47
End: 2017-11-20

## 2017-11-20 VITALS
HEART RATE: 86 BPM | OXYGEN SATURATION: 99 % | TEMPERATURE: 97.8 F | WEIGHT: 156 LBS | DIASTOLIC BLOOD PRESSURE: 82 MMHG | SYSTOLIC BLOOD PRESSURE: 129 MMHG | HEIGHT: 64 IN | BODY MASS INDEX: 26.63 KG/M2

## 2017-11-20 DIAGNOSIS — M54.41 ACUTE RIGHT-SIDED LOW BACK PAIN WITH RIGHT-SIDED SCIATICA: Primary | ICD-10-CM

## 2017-11-20 RX ORDER — OMEPRAZOLE 20 MG/1
20 CAPSULE, DELAYED RELEASE ORAL DAILY
Qty: 30 CAP | Refills: 0 | Status: SHIPPED | OUTPATIENT
Start: 2017-11-20

## 2017-11-20 RX ORDER — METHYLPREDNISOLONE 4 MG/1
4 TABLET ORAL
Qty: 21 TAB | Refills: 0 | Status: ON HOLD | OUTPATIENT
Start: 2017-11-20 | End: 2018-01-16

## 2017-11-20 NOTE — LETTER
NOTIFICATION RETURN TO WORK / SCHOOL 
 
11/20/2017 10:20 AM 
 
Ms. Claire Phoenix [address] [address] [address] [address] To Whom It May Concern: 
 
Claire Phoenix is currently under the care of [address] [place] [address] Haresh Whelan. Please allow Ms. Faisal Mcdaniels to remain out of work for three weeks. She will be evaluated by the Spine team in the next few days. No bending, lifting, twisting, or squatting. If there are questions or concerns please have the patient contact our office.  
 
 
 
Sincerely, 
 
 
Tash Palafox MD

## 2017-11-20 NOTE — MR AVS SNAPSHOT
Visit Information Date & Time Provider Department Dept. Phone Encounter #  
 11/20/2017  9:40 AM Ryann Lockett, [facility] [phone] [id number] Upcoming Health Maintenance Date Due DTaP/Tdap/Td series (1 - Tdap) 8/5/1993 PAP AKA CERVICAL CYTOLOGY 8/5/1993 Influenza Age 5 to Adult 8/1/2017 Allergies as of 11/20/2017  Review Complete On: 11/20/2017 By: Ryann Lockett MD  
 No Known Allergies Current Immunizations  Never Reviewed No immunizations on file. Not reviewed this visit You Were Diagnosed With   
  
 Codes Comments Acute right-sided low back pain with right-sided sciatica    -  Primary ICD-10-CM: M54.41 
ICD-9-CM: 724.2, 724.3 Vitals BP Pulse Temp Height(growth percentile) Weight(growth percentile) SpO2  
 129/82 86 97.8 °F (36.6 °C) (Oral) 5' 4\" (1.626 m) 156 lb (70.8 kg) 99% BMI Smoking Status 26.78 kg/m2 Never Smoker BMI and BSA Data Body Mass Index Body Surface Area  
 26.78 kg/m 2 1.79 m 2 Preferred Pharmacy Pharmacy Name Phone [facility] [address], [address] [facility] Your Updated Medication List  
  
   
This list is accurate as of: 11/20/17 10:23 AM.  Always use your most recent med list.  
  
  
  
  
 acetaminophen-codeine 300-30 mg per tablet Commonly known as:  TYLENOL-CODEINE #3 Take 1 Tab by mouth every four (4) hours as needed for Pain. Max Daily Amount: 6 Tabs. amLODIPine 2.5 mg tablet Commonly known as:  Dionisiodagiovanni Fady Take  by mouth daily. cyclobenzaprine 10 mg tablet Commonly known as:  FLEXERIL Take 1 Tab by mouth three (3) times daily as needed for Muscle Spasm(s). diclofenac EC 75 mg EC tablet Commonly known as:  VOLTAREN Take 1 Tab by mouth two (2) times daily (with meals). escitalopram oxalate 10 mg tablet Commonly known as:  Lyubov Almanza Take 1 Tab by mouth daily. methylPREDNISolone 4 mg tablet Commonly known as:  Rivas Hasting Take 1 Tab by mouth Specific Days and Specific Times. omeprazole 20 mg capsule Commonly known as:  PRILOSEC Take 1 Cap by mouth daily. Indications: PREVENTION OF NSAID-INDUCED GASTRIC ULCER  
  
 predniSONE 20 mg tablet Commonly known as:  Nanine Knife Take  by mouth daily (with breakfast). Prescriptions Sent to Pharmacy Refills  
 omeprazole (PRILOSEC) 20 mg capsule 0 Sig: Take 1 Cap by mouth daily. Indications: PREVENTION OF NSAID-INDUCED GASTRIC ULCER Class: Normal  
 Pharmacy: [address] [address] [facility] Ph #: [phone] Route: Oral  
 methylPREDNISolone (MEDROL DOSEPACK) 4 mg tablet 0 Sig: Take 1 Tab by mouth Specific Days and Specific Times. Class: Normal  
 Pharmacy: [id number] [address] Ph #: [phone] Route: Oral  
  
We Performed the Following REFERRAL TO ORTHOPEDICS [XYF780 Custom] Referral Information Referral ID Referred By Referred To  
  
 [id number] Anthony HILL MD   
   [address] [address] [address] Phone: [phone] Fax: [phone] Visits Status Start Date End Date 1 New Request 11/20/17 11/20/18 If your referral has a status of pending review or denied, additional information will be sent to support the outcome of this decision. Patient Instructions Please follow up with Spine. You are advised to contact us if your condition worsens. Back Pain: Care Instructions Your Care Instructions Back pain has many possible causes. It is often related to problems with muscles and ligaments of the back. It may also be related to problems with the nerves, discs, or bones of the back. Moving, lifting, standing, sitting, or sleeping in an awkward way can strain the back.  Sometimes you don't notice the injury until later. Arthritis is another common cause of back pain. Although it may hurt a lot, back pain usually improves on its own within several weeks. Most people recover in 12 weeks or less. Using good home treatment and being careful not to stress your back can help you feel better sooner. Follow-up care is a key part of your treatment and safety. Be sure to make and go to all appointments, and call your doctor if you are having problems. It's also a good idea to know your test results and keep a list of the medicines you take. How can you care for yourself at home? · Sit or lie in positions that are most comfortable and reduce your pain. Try one of these positions when you lie down: ¨ Lie on your back with your knees bent and supported by large pillows. ¨ Lie on the floor with your legs on the seat of a sofa or chair. Mica Jules on your side with your knees and hips bent and a pillow between your legs. ¨ Lie on your stomach if it does not make pain worse. · Do not sit up in bed, and avoid soft couches and twisted positions. Bed rest can help relieve pain at first, but it delays healing. Avoid bed rest after the first day of back pain. · Change positions every 30 minutes. If you must sit for long periods of time, take breaks from sitting. Get up and walk around, or lie in a comfortable position. · Try using a heating pad on a low or medium setting for 15 to 20 minutes every 2 or 3 hours. Try a warm shower in place of one session with the heating pad. · You can also try an ice pack for 10 to 15 minutes every 2 to 3 hours. Put a thin cloth between the ice pack and your skin. · Take pain medicines exactly as directed. ¨ If the doctor gave you a prescription medicine for pain, take it as prescribed. ¨ If you are not taking a prescription pain medicine, ask your doctor if you can take an over-the-counter medicine. · Take short walks several times a day.  You can start with 5 to 10 minutes, 3 or 4 times a day, and work up to longer walks. Walk on level surfaces and avoid hills and stairs until your back is better. · Return to work and other activities as soon as you can. Continued rest without activity is usually not good for your back. · To prevent future back pain, do exercises to stretch and strengthen your back and stomach. Learn how to use good posture, safe lifting techniques, and proper body mechanics. When should you call for help? Call your doctor now or seek immediate medical care if: 
? · You have new or worsening numbness in your legs. ? · You have new or worsening weakness in your legs. (This could make it hard to stand up.) ? · You lose control of your bladder or bowels. ? Watch closely for changes in your health, and be sure to contact your doctor if: 
? · Your pain gets worse. ? · You are not getting better after 2 weeks. Where can you learn more? Go to http://natalie-racquel.info/. Enter Q984 in the search box to learn more about \"Back Pain: Care Instructions. \" Current as of: March 21, 2017 Content Version: 11.4 © 7704-5928 [employer]. Care instructions adapted under license by [employer] (which disclaims liability or warranty for this information). If you have questions about a medical condition or this instruction, always ask your healthcare professional. [address] any warranty or liability for your use of this information. Introducing [facility]! Berna Burgos introduces Michael Bieker patient portal. Now you can access parts of your medical record, email your doctor's office, and request medication refills online. 1. In your internet browser, go to [email or web address] 2. Click on the First Time User? Click Here link in the Sign In box. You will see the New Member Sign Up page. 3. Enter your Michael Bieker Access Code exactly as it appears below.  You will not need to use this code after youve completed the sign-up process. If you do not sign up before the expiration date, you must request a new code. · [employer] Access Code: [email or web address] Expires: 1/15/2018  1:36 PM 
 
4. Enter the last four digits of your Social Security Number (xxxx) and Date of Birth (mm/dd/yyyy) as indicated and click Submit. You will be taken to the next sign-up page. 5. Create a [employer] ID. This will be your [employer] login ID and cannot be changed, so think of one that is secure and easy to remember. 6. Create a [employer] password. You can change your password at any time. 7. Enter your Password Reset Question and Answer. This can be used at a later time if you forget your password. 8. Enter your e-mail address. You will receive e-mail notification when new information is available in [address] 9. Click Sign Up. You can now view and download portions of your medical record. 10. Click the Download Summary menu link to download a portable copy of your medical information. If you have questions, please visit the Frequently Asked Questions section of the [employer] website. Remember, [employer] is NOT to be used for urgent needs. For medical emergencies, dial 911. Now available from your iPhone and Android! Please provide this summary of care documentation to your next provider. Your primary care clinician is listed as Sade Carrasco. If you have any questions after today's visit, please call [phone].

## 2017-11-20 NOTE — PROGRESS NOTES
AMBULATORY PROGRESS NOTE      Patient: Mark Dumont             MRN: [id number]     SSN: [id number] Body mass index is 26.78 kg/(m^2). YOB: 1970     AGE: 41 y.o. EX: female    PCP: Lb Hardin MD    IMPRESSION/DIAGNOSIS AND TREATMENT PLAN     DIAGNOSES  1. Acute right-sided low back pain with right-sided sciatica        Orders Placed This Encounter    [facility]    omeprazole (PRILOSEC) 20 mg capsule    methylPREDNISolone (MEDROL DOSEPACK) 4 mg tablet      Mark Dumont understands her diagnoses and the proposed plan. Plan:    1) Referral for Spine  2) Medrol Dosepak  3) Omeprazole 40 m PO every day; 30 tablets, 0 refills. 4) Work Note: Remain out of work for 3 weeks. RTO - Follow up with Dr. Horacio Baldwin IS A 41 y.o. female who presents to my outpatient office for follow up of lower back pain. At last visit, Robby Yoon PA-C, provided a referral for PT, prescribedFlexeril 10 mg tablets, as well as an MRI of her lumbar spine. So, the plan was also to see her after the MRI and have her take the Ultram at night and to continue her activity modification. She was cautioned that the Flexeril might make her drowsy, so she should only take it at nighttime, but to use the Ultram prescription for severe pain only. Then, she is going to follow up after the MRI. The patient presents to the office today rating her lumbar spine pain at 9/10. Patient states that physical therapy made her back symptoms worse. She reports that muscle spasms to the legs occur more frequently. Since these symptoms started the patient has been unable to sleep on her bed comfortably, she has slept on a recliner instead. Skelaxin 800 mg and Voltaren 75 mg has not provided relief for her condition. The patient works as a  at [employer].  The patient PCP tried recommending light duty for work but it was denied by [employer].      Appearance: Alert, well appearing and pleasant patient who is in no distress, oriented to person, place/time, and who follows commands. This patient is accompanied in the office by her  self. Gait: slow  Psychiatric: Affect and mood are appropriate. Cardiovascular/Peripheral Vascular: Normal Pulses to each hand and foot  Gait: normal      Tenderness:  Mild tenderness to the right paraspinal muscles                              Mild Tenderness to the PSIS  NEURO EXAM :                         Negative bilateral Straight leg raise (seated position)                        Negative SLR Supine Position both                         See Musculoskeletal section for pertinent individual extremity examination                        No abnormal hand/wrist, foot/ankle, or facial/neck tremors. REFLEX EXAM: reflexes are normal and symmetric. MOTOR: EXTREMITIES                        normal 5/5 strength in all tested muscle groups    SENSORY:   no sensory deficits noted    ROM SPINE:  Decreased lumbar spine ROM    CHART REVIEW     Past Medical History:   Diagnosis Date    Hypertension      Current Outpatient Prescriptions   Medication Sig    omeprazole (PRILOSEC) 20 mg capsule Take 1 Cap by mouth daily. Indications: PREVENTION OF NSAID-INDUCED GASTRIC ULCER    methylPREDNISolone (MEDROL DOSEPACK) 4 mg tablet Take 1 Tab by mouth Specific Days and Specific Times.  amLODIPine (NORVASC) 2.5 mg tablet Take  by mouth daily.  cyclobenzaprine (FLEXERIL) 10 mg tablet Take 1 Tab by mouth three (3) times daily as needed for Muscle Spasm(s).  acetaminophen-codeine (TYLENOL-CODEINE #3) 300-30 mg per tablet Take 1 Tab by mouth every four (4) hours as needed for Pain. Max Daily Amount: 6 Tabs.  diclofenac EC (VOLTAREN) 75 mg EC tablet Take 1 Tab by mouth two (2) times daily (with meals).  escitalopram oxalate (LEXAPRO) 10 mg tablet Take 1 Tab by mouth daily.  predniSONE (DELTASONE) 20 mg tablet Take  by mouth daily (with breakfast). No current facility-administered medications for this visit. No Known Allergies  Past Surgical History:   Procedure Laterality Date    HX TUBAL LIGATION       Social History     Occupational History    Not on file. Social History Main Topics    Smoking status: Never Smoker    Smokeless tobacco: Never Used    Alcohol use No    Drug use: No    Sexual activity: Yes     Partners: Male     Birth control/ protection: Surgical     Family History   Problem Relation Age of Onset    Diabetes Sister     Hypertension Sister     Elevated Lipids Sister     Hypertension Sister     Hypertension Sister     Hypertension Sister     Hypertension Sister     Hypertension Sister        REVIEW OF SYSTEMS : 11/20/2017  ALL BELOW ARE Negative except : SEE HPI       Constitutional: Negative for fever, chills and weight loss. Neg Weigh Loss  Cardiovascular: Negative for chest pain, claudication and leg swelling. SOB, POMPA   Gastrointestinal: Negative for  pain, N/V/D/C, Blood in stool or urine,dysuria, hematuria,        Incontinence, pelvic pain  Musculoskeletal: see HPI. Neurological: Negative for dizziness and weakness. Negative for headaches,Visual Changes, Confusion, Seizures,   Psychiatric/Behavioral: Negative for depression, memory loss and substance abuse. Extremities:  Negative for  hair changes, rash or skin lesion changes. Hematologic: Negative for Bleeding problems, bruising, pallor or swollen lymph nodes. Peripheral Vascular: No calf pain, vascular vein tenderness to calf pain              No calf throbbing, posterior knee throbbing pain    DIAGNOSTIC IMAGING     MRI Results (most recent):    Results from Hospital Encounter encounter on 11/17/17   MRI LUMB SPINE WO CONT   Narrative Sagittal and axial multisequence MR images of lumbar spine were obtained. HISTORY: Back pain going down to both legs. Straightening of lumbar lordosis with no spondylolisthesis.  No compression  fracture. No pathologic marrow signal. Paraspinous soft tissues unremarkable. Conus medullaris ends at L1 with normal morphology and signal intensity. From T12-L1 to L3-L4: Minimal posterior disc bulge with no central stenosis. Mild facet and ligamentous hypertrophy. No foraminal stenosis. L4-L5: Posterior disc bulge. No central stenosis. More prominent facet and  ligamentous hypertrophy. Mild to moderate bilateral foraminal narrowing. Disc  material slightly contacting the exiting nerve roots bilaterally. No definite  compression. L5-S1: Posterior disc bulge with left posterior lateral disc protrusion,  compressing the left descending S1 nerve root. Facet and ligamentous hypertrophy  slightly worse on the left. Mild to moderate left foraminal stenosis. Compression to left exiting L5 nerve root possible. Mild right foraminal  stenosis. Impression IMPRESSION:  1. Left-sided disease at L5-S1, clinical correlation? 2. Posterior disc bulge and facet/ligamentous hypertrophy most prominent at  L4-L5, with disc material contacting the exiting nerve roots in the neural  foramina but no definite compression. Written by Yossi Cr, as dictated by Janice Valiente MD. I, , Janice Valiente MD, confirm that all documentation is accurate.

## 2017-11-20 NOTE — PATIENT INSTRUCTIONS
Please follow up with Spine. You are advised to contact us if your condition worsens. Back Pain: Care Instructions  Your Care Instructions    Back pain has many possible causes. It is often related to problems with muscles and ligaments of the back. It may also be related to problems with the nerves, discs, or bones of the back. Moving, lifting, standing, sitting, or sleeping in an awkward way can strain the back. Sometimes you don't notice the injury until later. Arthritis is another common cause of back pain. Although it may hurt a lot, back pain usually improves on its own within several weeks. Most people recover in 12 weeks or less. Using good home treatment and being careful not to stress your back can help you feel better sooner. Follow-up care is a key part of your treatment and safety. Be sure to make and go to all appointments, and call your doctor if you are having problems. It's also a good idea to know your test results and keep a list of the medicines you take. How can you care for yourself at home? · Sit or lie in positions that are most comfortable and reduce your pain. Try one of these positions when you lie down:  ¨ Lie on your back with your knees bent and supported by large pillows. ¨ Lie on the floor with your legs on the seat of a sofa or chair. Majel Prophet on your side with your knees and hips bent and a pillow between your legs. ¨ Lie on your stomach if it does not make pain worse. · Do not sit up in bed, and avoid soft couches and twisted positions. Bed rest can help relieve pain at first, but it delays healing. Avoid bed rest after the first day of back pain. · Change positions every 30 minutes. If you must sit for long periods of time, take breaks from sitting. Get up and walk around, or lie in a comfortable position. · Try using a heating pad on a low or medium setting for 15 to 20 minutes every 2 or 3 hours. Try a warm shower in place of one session with the heating pad.   · You can also try an ice pack for 10 to 15 minutes every 2 to 3 hours. Put a thin cloth between the ice pack and your skin. · Take pain medicines exactly as directed. ¨ If the doctor gave you a prescription medicine for pain, take it as prescribed. ¨ If you are not taking a prescription pain medicine, ask your doctor if you can take an over-the-counter medicine. · Take short walks several times a day. You can start with 5 to 10 minutes, 3 or 4 times a day, and work up to longer walks. Walk on level surfaces and avoid hills and stairs until your back is better. · Return to work and other activities as soon as you can. Continued rest without activity is usually not good for your back. · To prevent future back pain, do exercises to stretch and strengthen your back and stomach. Learn how to use good posture, safe lifting techniques, and proper body mechanics. When should you call for help? Call your doctor now or seek immediate medical care if:  ? · You have new or worsening numbness in your legs. ? · You have new or worsening weakness in your legs. (This could make it hard to stand up.)   ? · You lose control of your bladder or bowels. ? Watch closely for changes in your health, and be sure to contact your doctor if:  ? · Your pain gets worse. ? · You are not getting better after 2 weeks. Where can you learn more? Go to http://natalie-racquel.info/. Enter A398 in the search box to learn more about \"Back Pain: Care Instructions. \"  Current as of: March 21, 2017  Content Version: 11.4  © 6423-4521 [employer]. Care instructions adapted under license by [employer] (which disclaims liability or warranty for this information). If you have questions about a medical condition or this instruction, always ask your healthcare professional. [address] any warranty or liability for your use of this information.

## 2017-11-22 ENCOUNTER — TELEPHONE (OUTPATIENT)
Dept: ORTHOPEDIC SURGERY | Age: 47
End: 2017-11-22

## 2017-11-22 NOTE — TELEPHONE ENCOUNTER
Last office note, MRI report and spine referral printed for patient to  at [facility] location.

## 2017-11-22 NOTE — TELEPHONE ENCOUNTER
Patient called stating she needs another note for work from Dr. Traci Crowe but not the same one she received on 11/20/17 after her appointment. She states that she gave her job the work note but she works at [employer] and at [employer] to them\" so she needs another note stating that she was there on 11/20/17 and she also said the MRI results are needed to give to her employer (?) in order for her to keep her job. Please advise patient at [phone].

## 2017-12-14 ENCOUNTER — OFFICE VISIT (OUTPATIENT)
Dept: ORTHOPEDIC SURGERY | Age: 47
End: 2017-12-14

## 2017-12-14 VITALS
SYSTOLIC BLOOD PRESSURE: 138 MMHG | RESPIRATION RATE: 20 BRPM | HEIGHT: 64 IN | DIASTOLIC BLOOD PRESSURE: 79 MMHG | HEART RATE: 113 BPM | BODY MASS INDEX: 28.03 KG/M2 | OXYGEN SATURATION: 99 % | TEMPERATURE: 98.6 F | WEIGHT: 164.2 LBS

## 2017-12-14 DIAGNOSIS — M51.36 DDD (DEGENERATIVE DISC DISEASE), LUMBAR: ICD-10-CM

## 2017-12-14 DIAGNOSIS — M54.16 LUMBAR RADICULOPATHY: Primary | ICD-10-CM

## 2017-12-14 RX ORDER — GABAPENTIN 300 MG/1
CAPSULE ORAL
Qty: 60 CAP | Refills: 1 | Status: SHIPPED | OUTPATIENT
Start: 2017-12-14

## 2017-12-14 NOTE — PATIENT INSTRUCTIONS
Sciatica: Care Instructions  Your Care Instructions    Sciatica (say \"zbu-SE-kz-kuh\") is an irritation of one of the sciatic nerves, which come from the spinal cord in the lower back. The sciatic nerves and their branches extend down through the buttock to the foot. Sciatica can develop when an injured disc in the back presses against a spinal nerve root. Its main symptom is pain, numbness, or weakness that is often worse in the leg or foot than in the back. Sciatica often will improve and go away with time. Early treatment usually includes medicines and exercises to relieve pain. Follow-up care is a key part of your treatment and safety. Be sure to make and go to all appointments, and call your doctor if you are having problems. It's also a good idea to know your test results and keep a list of the medicines you take. How can you care for yourself at home? · Take pain medicines exactly as directed. ¨ If the doctor gave you a prescription medicine for pain, take it as prescribed. ¨ If you are not taking a prescription pain medicine, ask your doctor if you can take an over-the-counter medicine. · Use heat or ice to relieve pain. ¨ To apply heat, put a warm water bottle, heating pad set on low, or warm cloth on your back. Do not go to sleep with a heating pad on your skin. ¨ To use ice, put ice or a cold pack on the area for 10 to 20 minutes at a time. Put a thin cloth between the ice and your skin. · Avoid sitting if possible, unless it feels better than standing. · Alternate lying down with short walks. Increase your walking distance as you are able to without making your symptoms worse. · Do not do anything that makes your symptoms worse. When should you call for help? Call 911 anytime you think you may need emergency care. For example, call if:  · You are unable to move a leg at all.   Call your doctor now or seek immediate medical care if:  · You have new or worse symptoms in your legs or buttocks. Symptoms may include:  ¨ Numbness or tingling. ¨ Weakness. ¨ Pain. · You lose bladder or bowel control. Watch closely for changes in your health, and be sure to contact your doctor if:  · You are not getting better as expected. Where can you learn more? Go to http://natalie-racquel.info/. Enter [phone] in the search box to learn more about \"Sciatica: Care Instructions. \"  Current as of: March 21, 2017  Content Version: 11.4  © 6868-4767 [employer]. Care instructions adapted under license by [employer] (which disclaims liability or warranty for this information). If you have questions about a medical condition or this instruction, always ask your healthcare professional. [address] any warranty or liability for your use of this information.

## 2017-12-14 NOTE — LETTER
NOTIFICATION RETURN TO WORK / SCHOOL 
 
12/14/2017 11:50 AM 
 
Ms. Paul Delgado [address] [address] [address] [address] To Whom It May Concern: 
 
Paul Delgado is currently under the care of Jesús Sheth. She will be out of work until evaluated at follow up appointment on January 22, 2017. If there are questions or concerns please have the patient contact our office. Sincerely, Dayanna Sheth MD

## 2017-12-14 NOTE — PROGRESS NOTES
[facility] [address]  [address], [address]  [place] [address]  Phone: [phone]  Fax: [phone]        Candy Colin  : 1970  PCP: Yaneth Rincon MD      NEW PATIENT      ASSESSMENT AND PLAN     Diagnoses and all orders for this visit:    1. Lumbar radiculopathy  -     SCHEDULE SURGERY    2. DDD (degenerative disc disease), lumbar    Other orders  -     gabapentin (NEURONTIN) 300 mg capsule; Take 1 tab po QHS for 3 days then increase to 1 tab po BID. 1. Advised to stay active as tolerated. Avoid repetitive BLT. 2. Trial of Gabapentin. 3. Set up for B/L L5 SNRB  4. Urgent symptoms discussed with pt. Follow-up Disposition:  Return for after injections. CHIEF COMPLAINT  Felix Bernard is seen today in consultation at the request of Dr. Kobe Hood for complaints of back pain. HISTORY OF PRESENT ILLNESS  Felix Bernard is a 41 y.o. female. Today pt c/o back pain of 2 months duration. Pt denies any specific incident or injury that caused their pain. Pt reports pain does radiate into BLE, posterior aspect. She reports sudden onset of pain and has been increasing since this time. Her pain is most severe at night and will prevent her from sleeping. Pt reports short standing and walking tolerance since this time. She denies any previous Hx of back or leg pain. Pt does have  weakness in BLE. She denies any paresthesias in her arms. She has been to physical therapy but this exacerbated her pain so she had to stop. She has had MDP with temporary relief. Pt admits to saddle paresthesias. She denies any incontinence. Pt states she has used Flexeril and Voltaren without relief. she has tried; PT-Yes,  Non-opioid medications Yes, and spinal injections No.  Denies persistent fevers, chills, weight changes, neurogenic bowel or bladder symptoms. Pt denies recent ED visits or hospitalizations. She has been out of work due to pain.  Family Hx of back pain (mother). Pain Assessment  12/14/2017   Location of Pain Back;Leg   Severity of Pain 8   Quality of Pain Aching;Burning   Quality of Pain Comment numbness, tingling   Frequency of Pain Constant   Aggravating Factors Standing;Bending   Aggravating Factors Comment lifting   Limiting Behavior -   Relieving Factors Heat   Relieving Factors Comment reclining   Result of Injury -           XR Results (most recent):  Lumbar spine xray films from Dr. Lakeshia Junior Office reviewed:   DIAGNOSTIC STUDIES:  X-rays of the lumbar spine, two views, today, [facility] View, AP and lateral.  The lateral radiographic film shows a small bone osteophyte at the inferior anterior endplate of L1, L2, and L3 regions primarily. There is no evidence of spondylolisthesis on this non-dynamic, non-stressed, non-flexion, non-extension view. The AP view shows what appears to be a slight scoliosis to the lumbar spine at the L4 vertebral body. It is hard to appreciate the left-sided lamina on this film, but there is some overlying bowel gas also in this area that does obscure detail in this region. Otherwise, the pedicles are fairly well-maintained throughout the lumbar spine region. The facets show some increased sclerosis, in my opinion, at the L5-S1 and L4-L5 regions.            MRI Results (most recent):    Results from Hospital Encounter encounter on 11/17/17   MRI LUMB SPINE WO CONT   Narrative Sagittal and axial multisequence MR images of lumbar spine were obtained. HISTORY: Back pain going down to both legs. Straightening of lumbar lordosis with no spondylolisthesis. No compression  fracture. No pathologic marrow signal. Paraspinous soft tissues unremarkable. Conus medullaris ends at L1 with normal morphology and signal intensity. From T12-L1 to L3-L4: Minimal posterior disc bulge with no central stenosis. Mild facet and ligamentous hypertrophy. No foraminal stenosis. L4-L5: Posterior disc bulge.  No central stenosis. More prominent facet and  ligamentous hypertrophy. Mild to moderate bilateral foraminal narrowing. Disc  material slightly contacting the exiting nerve roots bilaterally. No definite  compression. L5-S1: Posterior disc bulge with left posterior lateral disc protrusion,  compressing the left descending S1 nerve root. Facet and ligamentous hypertrophy  slightly worse on the left. Mild to moderate left foraminal stenosis. Compression to left exiting L5 nerve root possible. Mild right foraminal  stenosis. Impression IMPRESSION:  1. Left-sided disease at L5-S1, clinical correlation? 2. Posterior disc bulge and facet/ligamentous hypertrophy most prominent at  L4-L5, with disc material contacting the exiting nerve roots in the neural  foramina but no definite compression. PAST MEDICAL HISTORY   Past Medical History:   Diagnosis Date    Hypertension        Past Surgical History:   Procedure Laterality Date    HX TUBAL LIGATION         MEDICATIONS      Current Outpatient Prescriptions   Medication Sig Dispense Refill    gabapentin (NEURONTIN) 300 mg capsule Take 1 tab po QHS for 3 days then increase to 1 tab po BID. 60 Cap 1    amLODIPine (NORVASC) 2.5 mg tablet Take  by mouth daily.  omeprazole (PRILOSEC) 20 mg capsule Take 1 Cap by mouth daily. Indications: PREVENTION OF NSAID-INDUCED GASTRIC ULCER 30 Cap 0    methylPREDNISolone (MEDROL DOSEPACK) 4 mg tablet Take 1 Tab by mouth Specific Days and Specific Times. 21 Tab 0    predniSONE (DELTASONE) 20 mg tablet Take  by mouth daily (with breakfast).  cyclobenzaprine (FLEXERIL) 10 mg tablet Take 1 Tab by mouth three (3) times daily as needed for Muscle Spasm(s). 60 Tab 0    acetaminophen-codeine (TYLENOL-CODEINE #3) 300-30 mg per tablet Take 1 Tab by mouth every four (4) hours as needed for Pain. Max Daily Amount: 6 Tabs.  42 Tab 0    diclofenac EC (VOLTAREN) 75 mg EC tablet Take 1 Tab by mouth two (2) times daily (with meals). 60 Tab 0    escitalopram oxalate (LEXAPRO) 10 mg tablet Take 1 Tab by mouth daily. 30 Tab 0       ALLERGIES  No Known Allergies       SOCIAL HISTORY    Social History     Social History    Marital status:      Spouse name: N/A    Number of children: N/A    Years of education: N/A     Occupational History    Not on file. Social History Main Topics    Smoking status: Never Smoker    Smokeless tobacco: Never Used    Alcohol use No    Drug use: No    Sexual activity: Yes     Partners: Male     Birth control/ protection: Surgical     Other Topics Concern    Not on file     Social History Narrative       FAMILY HISTORY    Family History   Problem Relation Age of Onset    Diabetes Sister     Hypertension Sister     Elevated Lipids Sister     Hypertension Sister     Hypertension Sister     Hypertension Sister     Hypertension Sister     Hypertension Sister          REVIEW OF SYSTEMS  Review of Systems   Constitutional: Negative for chills, fever and weight loss. Respiratory: Negative for shortness of breath. Cardiovascular: Negative for chest pain. Gastrointestinal: Negative for constipation. Negative for fecal incontinence   Genitourinary: Negative for dysuria. Negative for urinary incontinence   Musculoskeletal:        Per HPI   Skin: Negative for rash. Neurological: Positive for focal weakness. Negative for dizziness, tingling, tremors and headaches. Endo/Heme/Allergies: Does not bruise/bleed easily. Psychiatric/Behavioral: The patient does not have insomnia. PHYSICAL EXAMINATION  Visit Vitals    /79    Pulse (!) 113    Temp 98.6 °F (37 °C) (Oral)    Resp 20    Ht 5' 4\" (1.626 m)    Wt 164 lb 3.2 oz (74.5 kg)    SpO2 99%    BMI 28.18 kg/m2          Accompanied by self. Constitutional:  Well developed, well nourished, in no acute distress. Psychiatric: Affect and mood are appropriate.    Integumentary: No rashes or abrasions noted on exposed areas. Cardiovascular/Peripheral Vascular: Intact l pulses. No peripheral edema is noted. Lymphatic:  No evidence of lymphedema. No cervical lymphadenopathy. SPINE/MUSCULOSKELETAL EXAM      Lumbar spine:  No rash, ecchymosis, or gross obliquity. No fasciculations. No focal atrophy is noted. Tenderness to palpation B/L L5-S1. No tenderness to palpation at the sciatic notch. SI joints non-tender. Trochanters non tender. Sensation grossly intact to light touch. MOTOR:       Hip Flex  Quads Hamstrings Ankle DF EHL Ankle PF   Right +4/5 +4/5 +4/5 +4/5 +4/5 +4/5   Left +4/5 +4/5 +4/5 +4/5 +4/5 +4/5     Mild R eversion weakness. Straight Leg raise + R 90 degrees. No difficulty with tandem gait. Difficulty with heel walk    Ambulation without assistive device. FWB. Written by Efrain Restrepo, as dictated by Cookie Sanchez MD.    I, Dr. Cookie Sanchez MD, confirm that all documentation is accurate. Ms. Dania Rodrigues may have a reminder for a \"due or due soon\" health maintenance. I have asked that she contact her primary care provider for follow-up on this health maintenance.

## 2017-12-18 ENCOUNTER — TELEPHONE (OUTPATIENT)
Dept: ORTHOPEDIC SURGERY | Age: 47
End: 2017-12-18

## 2017-12-18 NOTE — TELEPHONE ENCOUNTER
Patient is calling to let us know paperwork will be faxed to us from [facility]. Patient states her WIN # needs to go at the top of the page. Her WIN # [id number]. Patient can be reached at [phone] if needed.

## 2017-12-21 NOTE — TELEPHONE ENCOUNTER
We do not have any forms for this pt pls advise if and when she calls back. ..  I called she does not have a vm

## 2017-12-21 NOTE — TELEPHONE ENCOUNTER
I checked the form box and the need to pay for form shelf and there is no form for this patient in them. They will need to send it again and pt will need to pay for the form.   Call pt and inform please

## 2017-12-21 NOTE — TELEPHONE ENCOUNTER
Patient is calling again wanting an update on her paperwork that needs to be filled out and faxed to [facility].  Patient can be reached at [phone]

## 2017-12-26 ENCOUNTER — TELEPHONE (OUTPATIENT)
Dept: ORTHOPEDIC SURGERY | Age: 47
End: 2017-12-26

## 2017-12-26 NOTE — TELEPHONE ENCOUNTER
Contacted patient, informed patient, she would need to sign a release for our office to send over notes. Per patient, she has signed a release for Dr. Gilford Jesus, however, informed patient she would need to sign an authorization for our office as we are spine and separate in regards to Dr. Gilford Jesus office notes. Patient verbalized agreement/understanding. No further action required at this time.

## 2017-12-26 NOTE — TELEPHONE ENCOUNTER
Patient called in states that Jessica Lopez needs the office notes faxed over from the 12/14/17 appt with Dr. Lopez Pelletier. Patient can be reached at [phone] to be notified this has been completed.

## 2017-12-27 NOTE — TELEPHONE ENCOUNTER
HIPAA to release info to Sweetie received. Fax number verified with patient, LOV note faxed to fax number provided, confirmation received. No further action required at this time.

## 2018-01-16 ENCOUNTER — APPOINTMENT (OUTPATIENT)
Dept: GENERAL RADIOLOGY | Age: 48
End: 2018-01-16
Attending: PHYSICAL MEDICINE & REHABILITATION
Payer: COMMERCIAL

## 2018-01-16 ENCOUNTER — HOSPITAL ENCOUNTER (OUTPATIENT)
Age: 48
Setting detail: OUTPATIENT SURGERY
Discharge: HOME | End: 2018-01-16
Attending: PHYSICAL MEDICINE & REHABILITATION | Admitting: PHYSICAL MEDICINE & REHABILITATION
Payer: COMMERCIAL

## 2018-01-16 VITALS
TEMPERATURE: 98.5 F | BODY MASS INDEX: 28 KG/M2 | DIASTOLIC BLOOD PRESSURE: 87 MMHG | HEIGHT: 64 IN | SYSTOLIC BLOOD PRESSURE: 132 MMHG | HEART RATE: 110 BPM | RESPIRATION RATE: 18 BRPM | OXYGEN SATURATION: 100 % | WEIGHT: 164 LBS

## 2018-01-16 PROCEDURE — 74011250637 HC RX REV CODE- 250/637: Performed by: PHYSICAL MEDICINE & REHABILITATION

## 2018-01-16 PROCEDURE — 74011636320 HC RX REV CODE- 636/320: Performed by: PHYSICAL MEDICINE & REHABILITATION

## 2018-01-16 PROCEDURE — 76010000009 HC PAIN MGT 0 TO 30 MIN PROC: Performed by: PHYSICAL MEDICINE & REHABILITATION

## 2018-01-16 PROCEDURE — 74011636320 HC RX REV CODE- 636/320

## 2018-01-16 PROCEDURE — 74011250636 HC RX REV CODE- 250/636

## 2018-01-16 PROCEDURE — 74011000250 HC RX REV CODE- 250

## 2018-01-16 PROCEDURE — 74011250636 HC RX REV CODE- 250/636: Performed by: PHYSICAL MEDICINE & REHABILITATION

## 2018-01-16 PROCEDURE — 77030003672 HC NDL SPN HALY -A: Performed by: PHYSICAL MEDICINE & REHABILITATION

## 2018-01-16 PROCEDURE — 77030003669 HC NDL SPN COOK -B: Performed by: PHYSICAL MEDICINE & REHABILITATION

## 2018-01-16 RX ORDER — LIDOCAINE HYDROCHLORIDE 10 MG/ML
INJECTION, SOLUTION EPIDURAL; INFILTRATION; INTRACAUDAL; PERINEURAL AS NEEDED
Status: DISCONTINUED | OUTPATIENT
Start: 2018-01-16 | End: 2018-01-16 | Stop reason: HOSPADM

## 2018-01-16 RX ORDER — DIAZEPAM 5 MG/1
5-20 TABLET ORAL ONCE
Status: COMPLETED | OUTPATIENT
Start: 2018-01-16 | End: 2018-01-16

## 2018-01-16 RX ORDER — DEXAMETHASONE SODIUM PHOSPHATE 100 MG/10ML
INJECTION INTRAMUSCULAR; INTRAVENOUS AS NEEDED
Status: DISCONTINUED | OUTPATIENT
Start: 2018-01-16 | End: 2018-01-16 | Stop reason: HOSPADM

## 2018-01-16 RX ADMIN — DIAZEPAM 10 MG: 5 TABLET ORAL at 14:21

## 2018-01-16 NOTE — PROCEDURES
PROCEDURE NOTE      Patient Name: Jaison Saenz  Date of Procedure: January 16, 2018  Preoperative Diagnosis:  Lumbar radiculopathy [M54.16]  Post Operative Diagnosis:  Lumbar radiculopathy [M54.16]  Location:  [facility], Special Procedures Unit, [address] [place] [address]    Procedure :    bilateral  L5 Selective Nerve Root Block      Consent:  Informed consent was obtained prior to the procedure. The patient was given the opportunity to ask questions regarding the procedure and its associated risks. In addition to the potential risks associated with the procedure itself, the patient was informed both verbally and in writing of the potential side effects of the use of glucocorticoid. The patient appeared to comprehend the informed consent and desired to have the procedure performed. Procedure: The patient was placed in the prone position on the fluoroscopy table and the back was prepped and draped in the usual sterile manner. The exact spinal level was  identified using fluoroscopy, and Lidocaine 1 % was injected locally, a # 22 gauge spinal needle was passed to the transverse process. The depth was noted and the needle redirected to pass inferior and approximately one cm anterior to the transverse process. YES  1 cc of Isovue M-200 was used to verify positioning in the epidural and paravertebral space and outlined the course of the spinal nerve into the epidural space. The same procedure was repeated at each spinal level indicated above. No vascular uptake was identified. A total of 30 mg of preservative free Dexamethasone and 2 cc of Lidocaine was slowly injected. The patient tolerated the procedure well. The injection area was cleaned and bandaids applied. Not excessive bleeding was noted. Patient dressed and discharged to home with instructions. Discussion: The patient tolerated the procedure well.                                               [address] [place] [address], MD  January 16, 2018

## 2018-01-16 NOTE — INTERVAL H&P NOTE
H&P Update:  Haily Sandoval was seen and briefly examined. History and physical has been reviewed.   There have been no significant clinical changes since the completion of the originally dated History and Physical.    Signed By: [address], MD     January 16, 2018 2:43 PM

## 2018-01-16 NOTE — H&P
Office Visit    2017  [facility]  · [address], [place]   Physical Medicine and Rehab  · Lumbar radiculopathy +1 more   Dx  · Back Pain ; Referred by Clemencia Kumar MD   Reason for visit    Progress Notes         [address] [facility] [address]  [place], [address]  Phone: [phone]  Fax: [phone]           Joycelyn Portillo  : 1970  PCP: Jose Burdick MD        NEW PATIENT        ASSESSMENT AND PLAN     Diagnoses and all orders for this visit:     1. Lumbar radiculopathy  -     SCHEDULE SURGERY     2. DDD (degenerative disc disease), lumbar     Other orders  -     gabapentin (NEURONTIN) 300 mg capsule; Take 1 tab po QHS for 3 days then increase to 1 tab po BID. 1. Advised to stay active as tolerated. Avoid repetitive BLT. 2. Trial of Gabapentin. 3. Set up for B/L L5 SNRB  4. Urgent symptoms discussed with pt. Follow-up Disposition:  Return for after injections. CHIEF COMPLAINT  Katelyn Ayala is seen today in consultation at the request of Dr. Ruben Ordonez for complaints of back pain. HISTORY OF PRESENT ILLNESS  Katelyn Ayala is a 41 y.o. female. Today pt c/o back pain of 2 months duration. Pt denies any specific incident or injury that caused their pain. Pt reports pain does radiate into BLE, posterior aspect. She reports sudden onset of pain and has been increasing since this time. Her pain is most severe at night and will prevent her from sleeping. Pt reports short standing and walking tolerance since this time. She denies any previous Hx of back or leg pain. Pt does have  weakness in BLE. She denies any paresthesias in her arms. She has been to physical therapy but this exacerbated her pain so she had to stop. She has had MDP with temporary relief. Pt admits to saddle paresthesias. She denies any incontinence. Pt states she has used Flexeril and Voltaren without relief.   she has tried; PT-Yes,  Non-opioid medications Yes, and spinal injections No.  Denies persistent fevers, chills, weight changes, neurogenic bowel or bladder symptoms. Pt denies recent ED visits or hospitalizations. She has been out of work due to pain. Family Hx of back pain (mother). Pain Assessment  12/14/2017   Location of Pain Back;Leg   Severity of Pain 8   Quality of Pain Aching;Burning   Quality of Pain Comment numbness, tingling   Frequency of Pain Constant   Aggravating Factors Standing;Bending   Aggravating Factors Comment lifting   Limiting Behavior -   Relieving Factors Heat   Relieving Factors Comment reclining   Result of Injury -               XR Results (most recent):  Lumbar spine xray films from Dr. Sharmin Kong Office reviewed:   DIAGNOSTIC STUDIES:  X-rays of the lumbar spine, two views, today, [facility] View, AP and lateral.  The lateral radiographic film shows a small bone osteophyte at the inferior anterior endplate of L1, L2, and L3 regions primarily. There is no evidence of spondylolisthesis on this non-dynamic, non-stressed, non-flexion, non-extension view. The AP view shows what appears to be a slight scoliosis to the lumbar spine at the L4 vertebral body. It is hard to appreciate the left-sided lamina on this film, but there is some overlying bowel gas also in this area that does obscure detail in this region. Otherwise, the pedicles are fairly well-maintained throughout the lumbar spine region. The facets show some increased sclerosis, in my opinion, at the L5-S1 and L4-L5 regions. MRI Results (most recent):     Results from [place] encounter on 11/17/17   MRI LUMB SPINE WO CONT    Narrative Sagittal and axial multisequence MR images of lumbar spine were obtained. HISTORY: Back pain going down to both legs. Straightening of lumbar lordosis with no spondylolisthesis. No compression  fracture.  No pathologic marrow signal. Paraspinous soft tissues unremarkable. Conus medullaris ends at L1 with normal morphology and signal intensity. From T12-L1 to L3-L4: Minimal posterior disc bulge with no central stenosis. Mild facet and ligamentous hypertrophy. No foraminal stenosis. L4-L5: Posterior disc bulge. No central stenosis. More prominent facet and  ligamentous hypertrophy. Mild to moderate bilateral foraminal narrowing. Disc  material slightly contacting the exiting nerve roots bilaterally. No definite  compression. L5-S1: Posterior disc bulge with left posterior lateral disc protrusion,  compressing the left descending S1 nerve root. Facet and ligamentous hypertrophy  slightly worse on the left. Mild to moderate left foraminal stenosis. Compression to left exiting L5 nerve root possible. Mild right foraminal  stenosis. Impression IMPRESSION:  1. Left-sided disease at L5-S1, clinical correlation? 2. Posterior disc bulge and facet/ligamentous hypertrophy most prominent at  L4-L5, with disc material contacting the exiting nerve roots in the neural  foramina but no definite compression. PAST MEDICAL HISTORY        Past Medical History:   Diagnosis Date    Hypertension                 Past Surgical History:   Procedure Laterality Date    HX TUBAL LIGATION                    MEDICATIONS     Current Outpatient Prescriptions   Medication Sig Dispense Refill    gabapentin (NEURONTIN) 300 mg capsule Take 1 tab po QHS for 3 days then increase to 1 tab po BID. 60 Cap 1    amLODIPine (NORVASC) 2.5 mg tablet Take  by mouth daily.  omeprazole (PRILOSEC) 20 mg capsule Take 1 Cap by mouth daily. Indications: PREVENTION OF NSAID-INDUCED GASTRIC ULCER 30 Cap 0    methylPREDNISolone (MEDROL DOSEPACK) 4 mg tablet Take 1 Tab by mouth Specific Days and Specific Times. 21 Tab 0    predniSONE (DELTASONE) 20 mg tablet Take  by mouth daily (with breakfast).         cyclobenzaprine (FLEXERIL) 10 mg tablet Take 1 Tab by mouth three (3) times daily as needed for Muscle Spasm(s). 60 Tab 0    acetaminophen-codeine (TYLENOL-CODEINE #3) 300-30 mg per tablet Take 1 Tab by mouth every four (4) hours as needed for Pain. Max Daily Amount: 6 Tabs. 42 Tab 0    diclofenac EC (VOLTAREN) 75 mg EC tablet Take 1 Tab by mouth two (2) times daily (with meals). 60 Tab 0    escitalopram oxalate (LEXAPRO) 10 mg tablet Take 1 Tab by mouth daily. 30 Tab 0         ALLERGIES  No Known Allergies        SOCIAL HISTORY          Social History         Social History    Marital status:        Spouse name: N/A    Number of children: N/A    Years of education: N/A       Occupational History    Not on file. Social History Main Topics    Smoking status: Never Smoker    Smokeless tobacco: Never Used    Alcohol use No    Drug use: No    Sexual activity: Yes       Partners: Male       Birth control/ protection: Surgical        Other Topics Concern    Not on file   Social History Narrative       FAMILY HISTORY   Family History   Problem Relation Age of Onset    Diabetes Sister      Hypertension Sister      Elevated Lipids Sister      Hypertension Sister      Hypertension Sister      Hypertension Sister      Hypertension Sister      Hypertension Sister              REVIEW OF SYSTEMS  Review of Systems   Constitutional: Negative for chills, fever and weight loss. Respiratory: Negative for shortness of breath. Cardiovascular: Negative for chest pain. Gastrointestinal: Negative for constipation. Negative for fecal incontinence   Genitourinary: Negative for dysuria. Negative for urinary incontinence   Musculoskeletal:        Per HPI   Skin: Negative for rash. Neurological: Positive for focal weakness. Negative for dizziness, tingling, tremors and headaches. Endo/Heme/Allergies: Does not bruise/bleed easily. Psychiatric/Behavioral: The patient does not have insomnia.              PHYSICAL EXAMINATION       Visit Vitals    /79    Pulse (!) 113    Temp 98.6 °F (37 °C) (Oral)    Resp 20    Ht 5' 4\" (1.626 m)    Wt 164 lb 3.2 oz (74.5 kg)    SpO2 99%    BMI 28.18 kg/m2             Accompanied by self. Constitutional:  Well developed, well nourished, in no acute distress. Psychiatric: Affect and mood are appropriate. Integumentary: No rashes or abrasions noted on exposed areas. Cardiovascular/Peripheral Vascular: Intact l pulses. No peripheral edema is noted. Lymphatic:  No evidence of lymphedema. No cervical lymphadenopathy. SPINE/MUSCULOSKELETAL EXAM        Lumbar spine:  No rash, ecchymosis, or gross obliquity. No fasciculations. No focal atrophy is noted. Tenderness to palpation B/L L5-S1. No tenderness to palpation at the sciatic notch. SI joints non-tender. Trochanters non tender. Sensation grossly intact to light touch. MOTOR:         Hip Flex  Quads Hamstrings Ankle DF EHL Ankle PF   Right +4/5 +4/5 +4/5 +4/5 +4/5 +4/5   Left +4/5 +4/5 +4/5 +4/5 +4/5 +4/5      Mild R eversion weakness. Straight Leg raise + R 90 degrees. No difficulty with tandem gait. Difficulty with heel walk     Ambulation without assistive device. FWB. Written by Hortensia Dixon, as dictated by Darryl Garland MD.     I, Dr. Darryl Garland MD, confirm that all documentation is accurate. Ms. Osman Walters may have a reminder for a \"due or due soon\" health maintenance. I have asked that she contact her primary care provider for follow-up on this health maintenance. Note Details   Instructions   ·   Return for after injections. Patient Instructions       Sciatica: Care Instructions  Your Care Instructions    Sciatica (say \"oci-MI-rc-kuh\") is an irritation of one of the sciatic nerves, which come from the spinal cord in the lower back. The sciatic nerves and their branches extend down through the buttock to the foot.  Sciatica can develop when an injured disc in the back presses against a spinal nerve root. Its main symptom is pain, numbness, or weakness that is often worse in the leg or foot than in the back. Sciatica often will improve and go away with time. Early treatment usually includes medicines and exercises to relieve pain. Follow-up care is a key part of your treatment and safety. Be sure to make and go to all appointments, and call your doctor if you are having problems. It's also a good idea to know your test results and keep a list of the medicines you take. How can you care for yourself at home? · Take pain medicines exactly as directed. ¨ If the doctor gave you a prescription medicine for pain, take it as prescribed. ¨ If you are not taking a prescription pain medicine, ask your doctor if you can take an over-the-counter medicine. · Use heat or ice to relieve pain. ¨ To apply heat, put a warm water bottle, heating pad set on low, or warm cloth on your back. Do not go to sleep with a heating pad on your skin. ¨ To use ice, put ice or a cold pack on the area for 10 to 20 minutes at a time. Put a thin cloth between the ice and your skin. · Avoid sitting if possible, unless it feels better than standing. · Alternate lying down with short walks. Increase your walking distance as you are able to without making your symptoms worse. · Do not do anything that makes your symptoms worse. When should you call for help? Call 911 anytime you think you may need emergency care. For example, call if:  · You are unable to move a leg at all. Call your doctor now or seek immediate medical care if:  · You have new or worse symptoms in your legs or buttocks. Symptoms may include:  ¨ Numbness or tingling. ¨ Weakness. ¨ Pain. · You lose bladder or bowel control. Watch closely for changes in your health, and be sure to contact your doctor if:  · You are not getting better as expected. Where can you learn more? Go to http://natalie-racquel.info/.   Enter [phone] in the search box to learn more about \"Sciatica: Care Instructions. \"  Current as of: March 21, 2017  Content Version: 11.4  © 6124-9405 [employer]. Care instructions adapted under license by [employer] (which disclaims liability or warranty for this information). If you have questions about a medical condition or this instruction, always ask your healthcare professional. [place] [address] any warranty or liability for your use of this information. Additional Documentation   Vitals:   · /79   · Pulse  113   · Temp 98.6 °F (37 °C) (Oral)   · Resp 20   · Ht 5' 4\" (1.626 m)   · Wt 164 lb 3.2 oz (74.5 kg)   · SpO2 99%   · BMI 28.18 kg/m2   · BSA 1.83 m2    Flowsheets:  · Pain Assessment   ·    Encounter Info:   · Billing Info,   · History,   · Allergies,   · Detailed Report   ·    Communications   ·   Letter  Sent 12/14/2017   ·   [facility] Amb Comm Summary sent to Dahlia Mauricio MD  Sent 12/15/2017   Media   Scan on 1/5/2018 1514 by Shakir Huber : Internal Documents/Pain [id number] on 1/5/2018 1514 by Shakir Huber : Internal Documents/Pain Survey/[id number]   BestPractice Advisories   Click to view BestPractice Advisory history   Orders Placed   · SCHEDULE SURGERY   Medication Changes   ·   GABAPENTIN 300 mg Take 1 tab po QHS for 3 days then increase to 1 tab po BID.       Medication List   Visit Diagnoses   ·   Lumbar radiculopathy   ·   DDD (degenerative disc disease), lumbar      Problem List

## 2018-01-16 NOTE — DISCHARGE INSTRUCTIONS
[facility]   (YOLANDA)  Dr. Jeff Lo, Dr. Judd Pedraza, Dr. Muir Latin not drive a car, operate heavy machinery or dangerous equipment for 24 hours. * Activity as tolerated; rest for the remainder of the day. * Resume pre-block medications including those for your family doctor. * Do not drink alcoholic beverages for 24 hours. Alcohol and the medications you have received may interact and cause an adverse reaction. * You may feel better this evening and worse tomorrow, as the numbing medications wears off and the steroid has yet to begin to work. After 48 hrs the steroid should begin to release bringing you relief. * You may shower this evening and remove any bandages. * Avoid hot tubs and heating pads for 24 hours. You may use cold packs on the procedure site as tolerated for the first 24 hours. * If a headache develops, drink plenty of fluids and rest.  Take over the counter medications for headache if needed. If the headache continues longer than 24 hours, call MD at the [address] [place] [address]. [phone]    * Continue taking pain medications as needed. * You may resume your regular diet if tolerated. Otherwise, start with sips of water and advance slowly. * If Diabetic: check your blood sugar three times a day for the next 3 days. If your sugar is greater than 300 call your family doctor. If your sugar is greater than 400, have someone transport you to the nearest Emergency Room. * If you experience any of the following problems, Please Call the [address] [place] [address] at [phone].         * Shortness of Breath    * Fever of 101 or higher    * Nausea / Vomiting    * Severe Headache    * Weakness or numbness in arms or legs that is not      resolving    * Prolonged increase in pain greater than 4 days      DISCHARGE SUMMARY from Nurse      PATIENT INSTRUCTIONS:    After oral sedation, for 24 hours or while taking prescription Narcotics:  · Limit your activities  · Do not drive and operate hazardous machinery  · Do not make important personal or business decisions  · Do  not drink alcoholic beverages  · If you have not urinated within 8 hours after discharge, please contact your surgeon on call. Report the following to your surgeon:  · Excessive pain, swelling, redness or odor of or around the surgical area  · Temperature over 101  · Nausea and vomiting lasting longer than 4 hours or if unable to take medications  · Any signs of decreased circulation or nerve impairment to extremity: change in color, persistent  numbness, tingling, coldness or increase pain  · Any questions            What to do at Home:  Recommended activity: Activity as tolerated, NO DRIVING FOR 12 Hours post injection          *  Please give a list of your current medications to your Primary Care Provider. *  Please update this list whenever your medications are discontinued, doses are      changed, or new medications (including over-the-counter products) are added. *  Please carry medication information at all times in case of emergency situations. These are general instructions for a healthy lifestyle:    No smoking/ No tobacco products/ Avoid exposure to second hand smoke    Surgeon General's Warning:  Quitting smoking now greatly reduces serious risk to your health. Obesity, smoking, and sedentary lifestyle greatly increases your risk for illness    A healthy diet, regular physical exercise & weight monitoring are important for maintaining a healthy lifestyle    You may be retaining fluid if you have a history of heart failure or if you experience any of the following symptoms:  Weight gain of 3 pounds or more overnight or 5 pounds in a week, increased swelling in our hands or feet or shortness of breath while lying flat in bed.   Please call your doctor as soon as you notice any of these symptoms; do not wait until your next office visit. Recognize signs and symptoms of STROKE:    F-face looks uneven    A-arms unable to move or move unevenly    S-speech slurred or non-existent    T-time-call 911 as soon as signs and symptoms begin-DO NOT go       Back to bed or wait to see if you get better-TIME IS BRAIN.

## 2018-01-22 ENCOUNTER — OFFICE VISIT (OUTPATIENT)
Dept: ORTHOPEDIC SURGERY | Age: 48
End: 2018-01-22

## 2018-01-22 ENCOUNTER — TELEPHONE (OUTPATIENT)
Dept: ORTHOPEDIC SURGERY | Age: 48
End: 2018-01-22

## 2018-01-22 VITALS
BODY MASS INDEX: 28.99 KG/M2 | OXYGEN SATURATION: 100 % | SYSTOLIC BLOOD PRESSURE: 126 MMHG | WEIGHT: 169.8 LBS | DIASTOLIC BLOOD PRESSURE: 77 MMHG | HEIGHT: 64 IN | HEART RATE: 108 BPM | TEMPERATURE: 97.9 F

## 2018-01-22 DIAGNOSIS — M54.16 LUMBAR RADICULOPATHY: Primary | ICD-10-CM

## 2018-01-22 DIAGNOSIS — M25.561 ACUTE PAIN OF RIGHT KNEE: ICD-10-CM

## 2018-01-22 RX ORDER — TOPIRAMATE 25 MG/1
TABLET ORAL
Qty: 90 TAB | Refills: 1 | Status: SHIPPED | OUTPATIENT
Start: 2018-01-22

## 2018-01-22 NOTE — PROGRESS NOTES
[facility] [address]  [address], [address]  [place] [address] [facility]  Phone: [phone]  Fax: [phone]        Corwin Will  : 1970  PCP: Dahlia Mauricio MD    PROGRESS NOTE      ASSESSMENT AND PLAN    Diagnoses and all orders for this visit:    1. Lumbar radiculopathy  -     [54833] LS Spine 4V    2. Acute pain of right knee  -     [06471] Knee 1-2V    Other orders  -     topiramate (TOPAMAX) 25 mg tablet; Take 1 tab po QHS for 5 nights, then increase to 2 tabs po QHS for 5 nights, then increase to 3 tabs po QHS thereafter. 1. Advised to continue HEP. 2. DC Gabapentin. 3. Trial of Topamax. 4. Given information on preventing injuries. 5. Recommend Ortho f/u if on-going knee pain. Follow-up Disposition:  Return in about 1 month (around 2018). HISTORY OF PRESENT ILLNESS  Wendy Andres is a 41 y.o. female. Pt presents to the office for a f/u visit for back pain. She underwent a B/L L5 SNRB one week ago. She was given a trial of Gabapentin at her last visit. Pt reports a fall on her R buttock after getting out of her truck on 18. She states that her leg went out on her when stepping out of the vehicle. She denies any negative side effects from her injections. She continues to have some sciatic pain. Pt felt she was getting better after her injection until she fell. Since her fall, her pain has been flared. Pt has paresthesias in her anterior thighs which is a new symptom. She has pain in her R knee, had fluid drained a year ago. She states that her knee is starting to give her more issues. Back pain = leg pain. Pt denies saddle paresthesias. Pt states she has been using Gabapentin with no relief and daytime somnolence. Denies persistent fevers, chills, weight changes, neurogenic bowel or bladder symptoms. Pt denies recent ED visits or hospitalizations. Was working at [employer] as a .  Her light-duty was denied by [employer]. Has had physical therapy. Last saw Dr. Christian Mosher(?) last year for her knee pain. Pain Assessment  1/22/2018   Location of Pain Back   Severity of Pain 9   Quality of Pain Aching   Quality of Pain Comment stabbing   Frequency of Pain Constant   Aggravating Factors Walking;Standing   Aggravating Factors Comment -   Limiting Behavior -   Relieving Factors Rest   Relieving Factors Comment -   Result of Injury Yes   Work-Related Injury No   Type of Injury Fall       PAST MEDICAL HISTORY   Past Medical History:   Diagnosis Date    Hypertension        Past Surgical History:   Procedure Laterality Date    HX TUBAL LIGATION     . MEDICATIONS    Current Outpatient Prescriptions   Medication Sig Dispense Refill    amLODIPine (NORVASC) 2.5 mg tablet Take  by mouth daily.  gabapentin (NEURONTIN) 300 mg capsule Take 1 tab po QHS for 3 days then increase to 1 tab po BID. 60 Cap 1    omeprazole (PRILOSEC) 20 mg capsule Take 1 Cap by mouth daily. Indications: PREVENTION OF NSAID-INDUCED GASTRIC ULCER 30 Cap 0    predniSONE (DELTASONE) 20 mg tablet Take  by mouth daily (with breakfast).  cyclobenzaprine (FLEXERIL) 10 mg tablet Take 1 Tab by mouth three (3) times daily as needed for Muscle Spasm(s). 60 Tab 0    diclofenac EC (VOLTAREN) 75 mg EC tablet Take 1 Tab by mouth two (2) times daily (with meals). 60 Tab 0    escitalopram oxalate (LEXAPRO) 10 mg tablet Take 1 Tab by mouth daily. 30 Tab 0        ALLERGIES  No Known Allergies       SOCIAL HISTORY    Social History     Social History    Marital status:      Spouse name: N/A    Number of children: N/A    Years of education: N/A     Occupational History    Not on file.      Social History Main Topics    Smoking status: Never Smoker    Smokeless tobacco: Never Used    Alcohol use No    Drug use: No    Sexual activity: Yes     Partners: Male     Birth control/ protection: Surgical     Other Topics Concern    Not on file Social History Narrative       FAMILY HISTORY  Family History   Problem Relation Age of Onset    Diabetes Sister     Hypertension Sister     Elevated Lipids Sister     Hypertension Sister     Hypertension Sister     Hypertension Sister     Hypertension Sister     Hypertension Sister        REVIEW OF SYSTEMS  Review of Systems   Constitutional: Negative for chills, fever and weight loss. Respiratory: Negative for shortness of breath. Cardiovascular: Negative for chest pain. Gastrointestinal: Negative for constipation. Negative for fecal incontinence   Genitourinary: Negative for dysuria. Negative for urinary incontinence   Musculoskeletal:        Per HPI   Skin: Negative for rash. Neurological: Positive for tingling. Negative for dizziness, tremors, focal weakness and headaches. Endo/Heme/Allergies: Does not bruise/bleed easily. Psychiatric/Behavioral: The patient does not have insomnia. PHYSICAL EXAMINATION  Visit Vitals    /77    Pulse (!) 108    Temp 97.9 °F (36.6 °C)    Ht 5' 4\" (1.626 m)    Wt 169 lb 12.8 oz (77 kg)    SpO2 100%    BMI 29.15 kg/m2         Accompanied by self. Constitutional:  Well developed, well nourished, in no acute distress. Psychiatric: Affect and mood are appropriate. Integumentary: No rashes or abrasions noted on exposed areas. Cardiovascular/Peripheral Vascular: Intact l pulses. No peripheral edema is noted. Lymphatic:  No evidence of lymphedema. No cervical lymphadenopathy. SPINE/MUSCULOSKELETAL EXAM    Lumbar spine:  No rash, ecchymosis, or gross obliquity. No fasciculations. No focal atrophy is noted. Tenderness to palpation L5-S1 B/L. Tenderness to palpation of sacrum. No tenderness to palpation at the sciatic notch. SI joints non-tender. Trochanters non tender. Sensation grossly intact to light touch.       MOTOR:       Hip Flex  Quads Hamstrings Ankle DF EHL Ankle PF   Right +4/5 +4/5 +4/5 +4/5 +4/5 +4/5   Left +4/5 +4/5 +4/5 +4/5 +4/5 +4/5       Straight Leg raise negative. Pain with R knee ROM. Medial joint tenderness R knee. Ambulation without assistive device. Guarding R knee. RADIOGRAPHS  Lumbar spine xray films reviewed:  1) Disc space narrowing at L5-S1  2) No instability      Knee xray films reviewed:  1) Degenerative changes of knee      Written by Lupe River, as dictated by Jakub Montez MD.    I, Dr. Jakub Montez MD, confirm that all documentation is accurate. Ms. Camacho Nick may have a reminder for a \"due or due soon\" health maintenance. I have asked that she contact her primary care provider for follow-up on this health maintenance.

## 2018-01-22 NOTE — PATIENT INSTRUCTIONS
Back Care and Preventing Injuries: Care Instructions  Your Care Instructions    You can hurt your back doing many everyday activities: lifting a heavy box, bending down to garden, exercising at the gym, and even getting out of bed. But you can keep your back strong and healthy by doing some exercises. You also can follow a few tips for sitting, sleeping, and lifting to avoid hurting your back again. Talk to your doctor before you start an exercise program. Ask for help if you want to learn more about keeping your back healthy. Follow-up care is a key part of your treatment and safety. Be sure to make and go to all appointments, and call your doctor if you are having problems. It's also a good idea to know your test results and keep a list of the medicines you take. How can you care for yourself at home? · Stay at a healthy weight to avoid strain on your lower back. · Do not smoke. Smoking increases the risk of osteoporosis, which weakens the spine. If you need help quitting, talk to your doctor about stop-smoking programs and medicines. These can increase your chances of quitting for good. · Make sure you sleep in a position that maintains your back's normal curves and on a mattress that feels comfortable. Sleep on your side with a pillow between your knees, or sleep on your back with a pillow under your knees. These positions can reduce strain on your back. · When you get out of bed, lie on your side and bend both knees. Drop your feet over the edge of the bed as you push up with both arms. Scoot to the edge of the bed. Make sure your feet are in line with your rear end (buttocks), and then stand up. · If you must stand for a long time, put one foot on a stool, ledge, or box. Exercise to strengthen your back and other muscles  · Get at least 30 minutes of exercise on most days of the week. Walking is a good choice.  You also may want to do other activities, such as running, swimming, cycling, or playing tennis or team sports. · Stretch your back muscles. Here are few exercises to try:  Shar Alar on your back with your knees bent and your feet flat on the floor. Gently pull one bent knee to your chest. Put that foot back on the floor, and then pull the other knee to your chest. Hold for 15 to 30 seconds. Repeat 2 to 4 times. ¨ Do pelvic tilts. Lie on your back with your knees bent. Tighten your stomach muscles. Pull your belly button (navel) in and up toward your ribs. You should feel like your back is pressing to the floor and your hips and pelvis are slightly lifting off the floor. Hold for 6 seconds while breathing smoothly. · Keep your core muscles strong. The muscles of your back, belly (abdomen), and buttocks support your spine. ¨ Pull in your belly, and imagine pulling your navel toward your spine. Hold this for 6 seconds, then relax. Remember to keep breathing normally as you tense your muscles. ¨ Do curl-ups. Always do them with your knees bent. Keep your low back on the floor, and curl your shoulders toward your knees using a smooth, slow motion. Keep your arms folded across your chest. If this bothers your neck, try putting your hands behind your neck (not your head), with your elbows spread apart. ¨ Lie on your back with your knees bent and your feet flat on the floor. Tighten your belly muscles, and then push with your feet and raise your buttocks up a few inches. Hold this position 6 seconds as you continue to breathe normally, then lower yourself slowly to the floor. Repeat 8 to 12 times. ¨ If you like group exercise, try Pilates or yoga. These classes have poses that strengthen the core muscles. Protect your back when you sit  · Place a small pillow, a rolled-up towel, or a lumbar roll in the curve of your back if you need extra support. · Sit in a chair that is low enough to let you place both feet flat on the floor with both knees nearly level with your hips.  If your chair or desk is too high, use a foot rest to raise your knees. · When driving, keep your knees nearly level with your hips. Sit straight, and drive with both hands on the steering wheel. Your arms should be in a slightly bent position. · Try a kneeling chair, which helps tilt your hips forward. This takes pressure off your lower back. · Try sitting on an exercise ball. It can rock from side to side, which helps keep your back loose. Lift properly  · Squat down, bending at the hips and knees only. If you need to, put one knee to the floor and extend your other knee in front of you, bent at a right angle (half kneeling). · Press your chest straight forward. This helps keep your upper back straight while keeping a slight arch in your low back. · Hold the load as close to your body as possible, at the level of your navel. · Use your feet to change direction, taking small steps. · Lead with your hips as you change direction. Keep your shoulders in line with your hips as you move. Do not twist your body. · Set down your load carefully, squatting with your knees and hips only. When should you call for help? Watch closely for changes in your health, and be sure to contact your doctor if you have any problems. Where can you learn more? Go to http://natalie-racquel.info/. Enter S810 in the search box to learn more about \"Back Care and Preventing Injuries: Care Instructions. \"  Current as of: March 21, 2017  Content Version: 11.4  © 3707-7961 [employer]. Care instructions adapted under license by [employer] (which disclaims liability or warranty for this information). If you have questions about a medical condition or this instruction, always ask your healthcare professional. [place] [address] any warranty or liability for your use of this information.

## 2018-01-22 NOTE — PROGRESS NOTES
Verbal order entered per Dr. Breanna Gregory as documented on blue sheet:Topamax 25mg take 1 tab po QHS x 5 nights, then increase to 2 tabs po QHS x 5 nights, then increase to 3 tabs po QHS thereafter. Disp 90 with 1 refill.

## 2018-01-22 NOTE — MR AVS SNAPSHOT
[address] [place] [address] [address] [place] [address] 
[phone] Patient: Urszula Calderón MRN: [id number] Celena Cueva Visit Information Date & Time Provider Department Dept. Phone Encounter #  
 1/22/2018 12:50 PM Fidencio Pete  [facility], [address] [facility] [phone] [id number] Follow-up Instructions Return in about 1 month (around 2/22/2018). Upcoming Health Maintenance Date Due DTaP/Tdap/Td series (1 - Tdap) 8/5/1993 PAP AKA CERVICAL CYTOLOGY 8/5/1993 Influenza Age 5 to Adult 8/1/2017 Allergies as of 1/22/2018  Review Complete On: 1/22/2018 By: Rosa M Infante No Known Allergies Current Immunizations  Never Reviewed No immunizations on file. Not reviewed this visit You Were Diagnosed With   
  
 Codes Comments Lumbar radiculopathy    -  Primary ICD-10-CM: M54.16 
ICD-9-CM: 724.4 Acute pain of right knee     ICD-10-CM: M25.561 ICD-9-CM: 719.46 Vitals BP Pulse Temp Height(growth percentile) Weight(growth percentile) SpO2  
 126/77 (!) 108 97.9 °F (36.6 °C) 5' 4\" (1.626 m) 169 lb 12.8 oz (77 kg) 100% BMI Smoking Status 29.15 kg/m2 Never Smoker Vitals History BMI and BSA Data Body Mass Index Body Surface Area  
 29.15 kg/m 2 1.86 m 2 Preferred Pharmacy Pharmacy Name Phone [facility] [address] [facility], [address] [facility] Your Updated Medication List  
  
   
This list is accurate as of: 1/22/18  1:35 PM.  Always use your most recent med list. amLODIPine 2.5 mg tablet Commonly known as:  Lynnell Manual Take  by mouth daily. cyclobenzaprine 10 mg tablet Commonly known as:  FLEXERIL Take 1 Tab by mouth three (3) times daily as needed for Muscle Spasm(s). diclofenac EC 75 mg EC tablet Commonly known as:  VOLTAREN  
 Take 1 Tab by mouth two (2) times daily (with meals). escitalopram oxalate 10 mg tablet Commonly known as:  Bang Kayla Take 1 Tab by mouth daily. gabapentin 300 mg capsule Commonly known as:  NEURONTIN Take 1 tab po QHS for 3 days then increase to 1 tab po BID. omeprazole 20 mg capsule Commonly known as:  PRILOSEC Take 1 Cap by mouth daily. Indications: PREVENTION OF NSAID-INDUCED GASTRIC ULCER  
  
 predniSONE 20 mg tablet Commonly known as:  Merwyn Going Take  by mouth daily (with breakfast). We Performed the Following AMB POC XRAY, KNEE; 1/2 VIEWS [40883 CPT(R)] Comments:  
 Weight bearing B/L AMB POC XRAY, SPINE, LUMBOSACRAL; 4+ [id number] CPT(R)] Follow-up Instructions Return in about 1 month (around 2/22/2018). Patient Instructions Back Care and Preventing Injuries: Care Instructions Your Care Instructions You can hurt your back doing many everyday activities: lifting a heavy box, bending down to garden, exercising at the gym, and even getting out of bed. But you can keep your back strong and healthy by doing some exercises. You also can follow a few tips for sitting, sleeping, and lifting to avoid hurting your back again. Talk to your doctor before you start an exercise program. Ask for help if you want to learn more about keeping your back healthy. Follow-up care is a key part of your treatment and safety. Be sure to make and go to all appointments, and call your doctor if you are having problems. It's also a good idea to know your test results and keep a list of the medicines you take. How can you care for yourself at home? · Stay at a healthy weight to avoid strain on your lower back. · Do not smoke. Smoking increases the risk of osteoporosis, which weakens the spine. If you need help quitting, talk to your doctor about stop-smoking programs and medicines. These can increase your chances of quitting for good. · Make sure you sleep in a position that maintains your back's normal curves and on a mattress that feels comfortable. Sleep on your side with a pillow between your knees, or sleep on your back with a pillow under your knees. These positions can reduce strain on your back. · When you get out of bed, lie on your side and bend both knees. Drop your feet over the edge of the bed as you push up with both arms. Scoot to the edge of the bed. Make sure your feet are in line with your rear end (buttocks), and then stand up. · If you must stand for a long time, put one foot on a stool, ledge, or box. Exercise to strengthen your back and other muscles · Get at least 30 minutes of exercise on most days of the week. Walking is a good choice. You also may want to do other activities, such as running, swimming, cycling, or playing tennis or team sports. · Stretch your back muscles. Here are few exercises to try: ¨ Lie on your back with your knees bent and your feet flat on the floor. Gently pull one bent knee to your chest. Put that foot back on the floor, and then pull the other knee to your chest. Hold for 15 to 30 seconds. Repeat 2 to 4 times. ¨ Do pelvic tilts. Lie on your back with your knees bent. Tighten your stomach muscles. Pull your belly button (navel) in and up toward your ribs. You should feel like your back is pressing to the floor and your hips and pelvis are slightly lifting off the floor. Hold for 6 seconds while breathing smoothly. · Keep your core muscles strong. The muscles of your back, belly (abdomen), and buttocks support your spine. ¨ Pull in your belly, and imagine pulling your navel toward your spine. Hold this for 6 seconds, then relax. Remember to keep breathing normally as you tense your muscles. ¨ Do curl-ups. Always do them with your knees bent.  Keep your low back on the floor, and curl your shoulders toward your knees using a smooth, slow motion. Keep your arms folded across your chest. If this bothers your neck, try putting your hands behind your neck (not your head), with your elbows spread apart. ¨ Lie on your back with your knees bent and your feet flat on the floor. Tighten your belly muscles, and then push with your feet and raise your buttocks up a few inches. Hold this position 6 seconds as you continue to breathe normally, then lower yourself slowly to the floor. Repeat 8 to 12 times. ¨ If you like group exercise, try Pilates or yoga. These classes have poses that strengthen the core muscles. Protect your back when you sit · Place a small pillow, a rolled-up towel, or a lumbar roll in the curve of your back if you need extra support. · Sit in a chair that is low enough to let you place both feet flat on the floor with both knees nearly level with your hips. If your chair or desk is too high, use a foot rest to raise your knees. · When driving, keep your knees nearly level with your hips. Sit straight, and drive with both hands on the steering wheel. Your arms should be in a slightly bent position. · Try a kneeling chair, which helps tilt your hips forward. This takes pressure off your lower back. · Try sitting on an exercise ball. It can rock from side to side, which helps keep your back loose. Lift properly · Squat down, bending at the hips and knees only. If you need to, put one knee to the floor and extend your other knee in front of you, bent at a right angle (half kneeling). · Press your chest straight forward. This helps keep your upper back straight while keeping a slight arch in your low back. · Hold the load as close to your body as possible, at the level of your navel. · Use your feet to change direction, taking small steps. · Lead with your hips as you change direction. Keep your shoulders in line with your hips as you move. Do not twist your body. · Set down your load carefully, squatting with your knees and hips only. When should you call for help? Watch closely for changes in your health, and be sure to contact your doctor if you have any problems. Where can you learn more? Go to http://natalie-racquel.info/. Enter S810 in the search box to learn more about \"Back Care and Preventing Injuries: Care Instructions. \" Current as of: March 21, 2017 Content Version: 11.4 © 0439-4052 [employer]. Care instructions adapted under license by [employer] (which disclaims liability or warranty for this information). If you have questions about a medical condition or this instruction, always ask your healthcare professional. [address] any warranty or liability for your use of this information. Introducing [facility]! Marely Rasmussen introduces Optosecurity patient portal. Now you can access parts of your medical record, email your doctor's office, and request medication refills online. 1. In your internet browser, go to [email or web address] 2. Click on the First Time User? Click Here link in the Sign In box. You will see the New Member Sign Up page. 3. Enter your Optosecurity Access Code exactly as it appears below. You will not need to use this code after youve completed the sign-up process. If you do not sign up before the expiration date, you must request a new code. · Optosecurity Access Code: [email or web address] Expires: 4/16/2018 10:11 AM 
 
4. Enter the last four digits of your Social Security Number (xxxx) and Date of Birth (mm/dd/yyyy) as indicated and click Submit. You will be taken to the next sign-up page. 5. Create a Optosecurity ID. This will be your Optosecurity login ID and cannot be changed, so think of one that is secure and easy to remember. 6. Create a Optosecurity password. You can change your password at any time. 7. Enter your Password Reset Question and Answer. This can be used at a later time if you forget your password. 8. Enter your e-mail address. You will receive e-mail notification when new information is available in [address] 9. Click Sign Up. You can now view and download portions of your medical record. 10. Click the Download Summary menu link to download a portable copy of your medical information. If you have questions, please visit the Frequently Asked Questions section of the Braclet website. Remember, Braclet is NOT to be used for urgent needs. For medical emergencies, dial 911. Now available from your iPhone and Android! Please provide this summary of care documentation to your next provider. Your primary care clinician is listed as Tabitha Ponce. If you have any questions after today's visit, please call [phone].

## 2018-01-22 NOTE — TELEPHONE ENCOUNTER
Pt seen today be dr Dalia Lopez:  Pt is requesting today's office visit notes to be faxed to her [facility] . Per pt tali phone/fax numbers are on file.     If necessary the pt can be reached at [phone]

## 2018-02-08 NOTE — PROGRESS NOTES
[facility]              [address] [facility]        [place], [address] [place]   [phone] fax    Discharge Summary  Patient name: Mark Dumont Start of Care: 10/25/17   Referral source: Doron Lyons MD : 1970   Medical/Treatment Diagnosis: Lumbago with sciatica, right side [M54.41] Onset Date:~1 month prior to initial visit     Prior Hospitalization: see medical history Provider#: [id number]   Medications: Verified on Patient Summary List    Comorbidities: anxiety/panic disorder, back pain, depression, HA, HBP, sleep dysfunction   Prior Level of Function: Independent w/ ADLs and work tasks, no back pain   Visits from Start of Care: 1    Missed Visits: 4  Reporting Period : 10/25/17 to 10/25/17    Summary of Care:  Pt never returned to complete any follow up visits. DC to HEP at this time.        ASSESSMENT/RECOMMENDATIONS:  [x]Discontinue therapy: []Patient has reached or is progressing toward set goals      [x]Patient is non-compliant or has abdicated      []Due to lack of appreciable progress towards set Zeb Brar PT 2018 6:05 PM

## 2018-02-19 ENCOUNTER — OFFICE VISIT (OUTPATIENT)
Dept: ORTHOPEDIC SURGERY | Age: 48
End: 2018-02-19

## 2018-02-19 ENCOUNTER — TELEPHONE (OUTPATIENT)
Dept: ORTHOPEDIC SURGERY | Age: 48
End: 2018-02-19

## 2018-02-19 VITALS
SYSTOLIC BLOOD PRESSURE: 124 MMHG | TEMPERATURE: 98.1 F | DIASTOLIC BLOOD PRESSURE: 75 MMHG | BODY MASS INDEX: 29.23 KG/M2 | WEIGHT: 171.2 LBS | HEART RATE: 102 BPM | OXYGEN SATURATION: 100 % | RESPIRATION RATE: 18 BRPM | HEIGHT: 64 IN

## 2018-02-19 DIAGNOSIS — M54.16 LUMBAR RADICULOPATHY: Primary | ICD-10-CM

## 2018-02-19 RX ORDER — DICLOFENAC SODIUM 75 MG/1
TABLET, DELAYED RELEASE ORAL
Qty: 60 TAB | Refills: 1 | Status: SHIPPED | OUTPATIENT
Start: 2018-02-19

## 2018-02-19 RX ORDER — TOPIRAMATE 25 MG/1
TABLET ORAL
Qty: 270 TAB | Refills: 1 | Status: SHIPPED | OUTPATIENT
Start: 2018-02-19

## 2018-02-19 NOTE — PROGRESS NOTES
[address]  [address], [address]  [place] [address]  Phone: [phone]  Fax: [phone]        Kenya Baron  : 1970  PCP: Faustino Ley MD    PROGRESS NOTE      ASSESSMENT AND PLAN    Diagnoses and all orders for this visit:    1. Lumbar radiculopathy  -     topiramate (TOPAMAX) 25 mg tablet; 3 tabs PO QHS  -     diclofenac EC (VOLTAREN) 75 mg EC tablet; 1 tab once to twice a day with food prn pain    1. Advised to continue HEP. 2. F/u with Dr. Romy Vazquez for knee pain/effusion. 3. Continue Topamax routine and Diclofenac prn.   4. Given information on preventing injuries. Follow-up Disposition:  Return in about 3 months (around 2018). HISTORY OF PRESENT ILLNESS  Elizabeth Maravilla is a 41 y.o. female. Pt presents to the office for a f/u visit for back pain. Last visit she was given a trial of Topamax. She is taking Topamax 75 mg with relief. Pt continues to have some  back pain. She states that she has had flared pain due to the weather change. Pt reports paresthesias does radiate into BLE, but is less intense. Pt reports swelling and pain in her knees. Has not seen Ortho recently. Pt has had fluid drained from her knee by Dr. Romy Vazquez in the past. Pt does not have weakness in BLE. Pt denies saddle paresthesias. Pt states she has been using Diclofenac with relief. Denies persistent fevers, chills, weight changes, neurogenic bowel or bladder symptoms. Pt denies recent ED visits or hospitalizations.  reviewed. Was working at [employer] as a . Her light-duty was denied by [address]. Has had physical therapy.      Pain Assessment  2018   Location of Pain Back   Severity of Pain 8   Quality of Pain Aching   Quality of Pain Comment tingling   Frequency of Pain Constant   Aggravating Factors (No Data)   Aggravating Factors Comment patient states pain comes about at any time   Limiting Behavior -   Relieving Factors Heat   Relieving Factors Comment -   Result of Injury -   Work-Related Injury -   Type of Injury -       PAST MEDICAL HISTORY   Past Medical History:   Diagnosis Date    Hypertension        Past Surgical History:   Procedure Laterality Date    HX TUBAL LIGATION     . MEDICATIONS    Current Outpatient Prescriptions   Medication Sig Dispense Refill    topiramate (TOPAMAX) 25 mg tablet Take 1 tab po QHS for 5 nights, then increase to 2 tabs po QHS for 5 nights, then increase to 3 tabs po QHS thereafter. 90 Tab 1    gabapentin (NEURONTIN) 300 mg capsule Take 1 tab po QHS for 3 days then increase to 1 tab po BID. 60 Cap 1    omeprazole (PRILOSEC) 20 mg capsule Take 1 Cap by mouth daily. Indications: PREVENTION OF NSAID-INDUCED GASTRIC ULCER 30 Cap 0    amLODIPine (NORVASC) 2.5 mg tablet Take  by mouth daily.  predniSONE (DELTASONE) 20 mg tablet Take  by mouth daily (with breakfast).  cyclobenzaprine (FLEXERIL) 10 mg tablet Take 1 Tab by mouth three (3) times daily as needed for Muscle Spasm(s). 60 Tab 0    diclofenac EC (VOLTAREN) 75 mg EC tablet Take 1 Tab by mouth two (2) times daily (with meals). 60 Tab 0    escitalopram oxalate (LEXAPRO) 10 mg tablet Take 1 Tab by mouth daily. 30 Tab 0        ALLERGIES  Allergies   Allergen Reactions    Bactrim [Sulfamethoprim] Diarrhea and Nausea and Vomiting          SOCIAL HISTORY    Social History     Social History    Marital status:      Spouse name: N/A    Number of children: N/A    Years of education: N/A     Occupational History    Not on file.      Social History Main Topics    Smoking status: Never Smoker    Smokeless tobacco: Never Used    Alcohol use No    Drug use: No    Sexual activity: Yes     Partners: Male     Birth control/ protection: Surgical     Other Topics Concern    Not on file     Social History Narrative       FAMILY HISTORY  Family History   Problem Relation Age of Onset    Diabetes Sister     Hypertension Sister     Elevated Lipids Sister     Hypertension Sister     Hypertension Sister     Hypertension Sister     Hypertension Sister     Hypertension Sister        REVIEW OF SYSTEMS  Review of Systems   Constitutional: Negative for chills, fever and weight loss. Respiratory: Negative for shortness of breath. Cardiovascular: Negative for chest pain. Gastrointestinal: Negative for constipation. Negative for fecal incontinence   Genitourinary: Negative for dysuria. Negative for urinary incontinence   Musculoskeletal:        Per HPI   Skin: Negative for rash. Neurological: Negative for dizziness, tingling, tremors, focal weakness and headaches. Endo/Heme/Allergies: Does not bruise/bleed easily. Psychiatric/Behavioral: The patient does not have insomnia. PHYSICAL EXAMINATION  Visit Vitals    /75    Pulse (!) 102    Temp 98.1 °F (36.7 °C) (Oral)    Resp 18    Ht 5' 4\" (1.626 m)    Wt 171 lb 3.2 oz (77.7 kg)    SpO2 100%    BMI 29.39 kg/m2         Accompanied by self. Constitutional:  Well developed, well nourished, in no acute distress. Psychiatric: Affect and mood are appropriate. Integumentary: No rashes or abrasions noted on exposed areas. Cardiovascular/Peripheral Vascular: Intact l pulses. No peripheral edema is noted. Lymphatic:  No evidence of lymphedema. No cervical lymphadenopathy. SPINE/MUSCULOSKELETAL EXAM    Lumbar spine:  No rash, ecchymosis, or gross obliquity. No fasciculations. No focal atrophy is noted. Tenderness to palpation L4-5. No tenderness to palpation at the sciatic notch. SI joints non-tender. Trochanters non tender. Sensation grossly intact to light touch. MOTOR:       Hip Flex  Quads Hamstrings Ankle DF EHL Ankle PF   Right +4/5 +4/5 +4/5 +4/5 +4/5 +4/5   Left +4/5 +4/5 +4/5 +4/5 +4/5 +4/5       Straight Leg raise negative. Ambulation without assistive device. FWB.     Written by Ruthie Smith as dictated by Spring Mercado MD.    I, Dr. Spring Mercado MD, confirm that all documentation is accurate. Ms. Christofer Gonzales may have a reminder for a \"due or due soon\" health maintenance. I have asked that she contact her primary care provider for follow-up on this health maintenance.

## 2018-02-19 NOTE — PATIENT INSTRUCTIONS
Back Care and Preventing Injuries: Care Instructions  Your Care Instructions    You can hurt your back doing many everyday activities: lifting a heavy box, bending down to garden, exercising at the gym, and even getting out of bed. But you can keep your back strong and healthy by doing some exercises. You also can follow a few tips for sitting, sleeping, and lifting to avoid hurting your back again. Talk to your doctor before you start an exercise program. Ask for help if you want to learn more about keeping your back healthy. Follow-up care is a key part of your treatment and safety. Be sure to make and go to all appointments, and call your doctor if you are having problems. It's also a good idea to know your test results and keep a list of the medicines you take. How can you care for yourself at home? · Stay at a healthy weight to avoid strain on your lower back. · Do not smoke. Smoking increases the risk of osteoporosis, which weakens the spine. If you need help quitting, talk to your doctor about stop-smoking programs and medicines. These can increase your chances of quitting for good. · Make sure you sleep in a position that maintains your back's normal curves and on a mattress that feels comfortable. Sleep on your side with a pillow between your knees, or sleep on your back with a pillow under your knees. These positions can reduce strain on your back. · When you get out of bed, lie on your side and bend both knees. Drop your feet over the edge of the bed as you push up with both arms. Scoot to the edge of the bed. Make sure your feet are in line with your rear end (buttocks), and then stand up. · If you must stand for a long time, put one foot on a stool, ledge, or box. Exercise to strengthen your back and other muscles  · Get at least 30 minutes of exercise on most days of the week. Walking is a good choice.  You also may want to do other activities, such as running, swimming, cycling, or playing tennis or team sports. · Stretch your back muscles. Here are few exercises to try:  Siddharth Foil on your back with your knees bent and your feet flat on the floor. Gently pull one bent knee to your chest. Put that foot back on the floor, and then pull the other knee to your chest. Hold for 15 to 30 seconds. Repeat 2 to 4 times. ¨ Do pelvic tilts. Lie on your back with your knees bent. Tighten your stomach muscles. Pull your belly button (navel) in and up toward your ribs. You should feel like your back is pressing to the floor and your hips and pelvis are slightly lifting off the floor. Hold for 6 seconds while breathing smoothly. · Keep your core muscles strong. The muscles of your back, belly (abdomen), and buttocks support your spine. ¨ Pull in your belly, and imagine pulling your navel toward your spine. Hold this for 6 seconds, then relax. Remember to keep breathing normally as you tense your muscles. ¨ Do curl-ups. Always do them with your knees bent. Keep your low back on the floor, and curl your shoulders toward your knees using a smooth, slow motion. Keep your arms folded across your chest. If this bothers your neck, try putting your hands behind your neck (not your head), with your elbows spread apart. ¨ Lie on your back with your knees bent and your feet flat on the floor. Tighten your belly muscles, and then push with your feet and raise your buttocks up a few inches. Hold this position 6 seconds as you continue to breathe normally, then lower yourself slowly to the floor. Repeat 8 to 12 times. ¨ If you like group exercise, try Pilates or yoga. These classes have poses that strengthen the core muscles. Protect your back when you sit  · Place a small pillow, a rolled-up towel, or a lumbar roll in the curve of your back if you need extra support. · Sit in a chair that is low enough to let you place both feet flat on the floor with both knees nearly level with your hips.  If your chair or desk is too high, use a foot rest to raise your knees. · When driving, keep your knees nearly level with your hips. Sit straight, and drive with both hands on the steering wheel. Your arms should be in a slightly bent position. · Try a kneeling chair, which helps tilt your hips forward. This takes pressure off your lower back. · Try sitting on an exercise ball. It can rock from side to side, which helps keep your back loose. Lift properly  · Squat down, bending at the hips and knees only. If you need to, put one knee to the floor and extend your other knee in front of you, bent at a right angle (half kneeling). · Press your chest straight forward. This helps keep your upper back straight while keeping a slight arch in your low back. · Hold the load as close to your body as possible, at the level of your navel. · Use your feet to change direction, taking small steps. · Lead with your hips as you change direction. Keep your shoulders in line with your hips as you move. Do not twist your body. · Set down your load carefully, squatting with your knees and hips only. When should you call for help? Watch closely for changes in your health, and be sure to contact your doctor if you have any problems. Where can you learn more? Go to http://natalie-racquel.info/. Enter S810 in the search box to learn more about \"Back Care and Preventing Injuries: Care Instructions. \"  Current as of: March 21, 2017  Content Version: 11.4  © 3058-9658 [employer]. Care instructions adapted under license by [employer] (which disclaims liability or warranty for this information). If you have questions about a medical condition or this instruction, always ask your healthcare professional. [place] [address] any warranty or liability for your use of this information.

## 2018-02-19 NOTE — TELEPHONE ENCOUNTER
Patient seen today by dr Fernie Wheeler and is requesting today's office visit notes to be faxed to her tali reed mgr.

## 2018-02-19 NOTE — PROGRESS NOTES
Verbal order entered per Dr. Geri Coello as documented on blue sheet:  -topamax 25 mg, 3 tabs PO QHS, disp 270, 1 RF  -voltaren 75 mg, 1 tab PO every day-BID with food prn pain, disp 60, 1 RF

## 2018-02-27 ENCOUNTER — DOCUMENTATION ONLY (OUTPATIENT)
Dept: ORTHOPEDIC SURGERY | Age: 48
End: 2018-02-27

## 2018-03-07 ENCOUNTER — TELEPHONE (OUTPATIENT)
Dept: ORTHOPEDIC SURGERY | Age: 48
End: 2018-03-07

## 2018-03-07 NOTE — TELEPHONE ENCOUNTER
PATIENT CALLED FOR  . PATIENT SAID THAT SHE IS GOING TO LOSE HER JOB, DUE TO HER JOB CALLED HER TODAY SAYING THAT THEY SPOKE TO US, AND THAT WE TOLD THEM THAT SHE COULD HAVE RETURNED TO WORK ON 02/19/18 , WHEN SHE LAST SAW [address]. PATIENT STATES THAT NO ONE TOLD HER THAT SHE WAS SUPPOSED TO RETURN TO WORK. THAT SHE IS STILL IN PAIN. THAT [facility]. PATIENT SAID THAT WHEN SHE WAS OUT HER JOB, [employer] HAD STILL BEEN PAYING HER, BUT NOW THEY ARE TELLING HER SHE SHOULD HAVE RETURNED ON 02/19/18. PATIENT SAID THAT THE LAST TIME SHE SAW Dillon Tucker KNOWS SHE WAS IN PAIN, SO SHE SAID SHE KNOWS DR. Mya Beck WOULD NOT HAVE TOLD HER JOB THAT SHE WAS SUPPOSED TO RETURN ON 02/19/18. PATIENT SAID THAT HER JOB [employer] NEEDS A NOTE FROM DR. WEST; THAT DR. Mya Beck HAS HER OUT OF WORK FROM 02/19/18 UNTIL HER NEXT VISIT WITH  ON 05/21/2018 DUE TO HER BACK PAIN. PLEASE FAX NOTE TO [facility] FAX # [phone]. PATIENT TEL. [phone].

## 2018-03-07 NOTE — TELEPHONE ENCOUNTER
Dr Leelee Montilla,    Looks like Lauren filled out a disability form returning her to work on 2/19/18 with no restrictions. Your note states [employer] would not accomodate her light duty restrictions. The last OOW note was on 12/14/18 taking her out until 1/22/18. She last saw you on 2/19/18 and there was no OOW note given. What would you like to do?

## 2018-03-07 NOTE — LETTER
NOTIFICATION RETURN TO WORK / SCHOOL 
 
3/8/2018 2:53 PM 
 
Ms. Johnson [address], [place] [id number] [address] To Whom It May Concern: 
 
Elizabeth Maravilla is currently under the care of [address] [place] [address]. She will return to work on March 8, 2018 with the following restrictions:  
 
No lifting >20lbs No prolonged standing No repetitive bending or twisting If there are questions or concerns please have the patient contact our office. Sincerely, Anthony Jenkins MD

## 2018-03-08 NOTE — TELEPHONE ENCOUNTER
She should be able to go to work with restrictions:    No lifting >20#  No prolonged standing  No repetitive bending or twisting. I can not keep her OOW for her back based on her MRI findings. They may or may not be able to accomodate her restrictions. We can amend her paperwork with today's date.

## 2018-03-08 NOTE — TELEPHONE ENCOUNTER
Pt called and I informed her that Dr Shukri Selby will continue her OOW through today and she is to RTW tomorrow with the listed restrictions.   Please write up the letter and fax it to where it needs to go

## 2018-03-08 NOTE — TELEPHONE ENCOUNTER
Letter written and signed by Dr. Luma Colon. Attempted to call patient, no voice mail set up to leave message. Letter will be up front. If patient calls back, if she needs it faxed to her employer please get fax number from patient.

## 2018-03-08 NOTE — TELEPHONE ENCOUNTER
Per Dr Cinda Cox, we can give the patient a letter stating that she was out of work until today. Then she needs to RTW tomorrow with the restrictions stated below.

## 2018-03-09 NOTE — TELEPHONE ENCOUNTER
Patient called again stating that the letter needs to be faxed to both [phone] and [phone].

## 2018-03-12 ENCOUNTER — TELEPHONE (OUTPATIENT)
Dept: ORTHOPEDIC SURGERY | Age: 48
End: 2018-03-12

## 2018-03-12 NOTE — TELEPHONE ENCOUNTER
PT CAME BY [employer] TO SAY HER WORK DID NOT RECEIVE FAXED LETTER ON 3/9/18 AND THEY FIRED HER. SHE NEEDS ANOTHER LETTER STATING SHE WAS OUT OF WORK FROM 2/19/18 TO 3/8/18 WITH RESTRICTIONS LISTED ON LAST NOTE.   PLEASE CALL PT WHEN DONE

## 2018-03-12 NOTE — LETTER
NOTIFICATION RETURN TO WORK / SCHOOL 
 
3/12/2018 11:20 AM 
 
Ms. Urszula [address]ón [address], [place] [id number] [address] [address] To Whom It May Concern: 
 
Urszula Calderón is currently under the care of [address] [place] [address]. She was out of work from 2/19/18 through 3/8/18. She will return to work with the following restrictions: 
 
No lifting >20lbs No prolonged standing No repetitive bending or twisting If there are questions or concerns please have the patient contact our office. Sincerely, Fidencio Pete MD

## 2018-03-12 NOTE — TELEPHONE ENCOUNTER
Spoke with patient, informed that I will place copies of the confirmed faxes that were sent on 3/9/18 to the employment faxes she provided. Patient stated understanding, but that also it needs to be written out that she was out of work from 2/19 til 3/8 and that faxed as well. Informed patient letter would be updated, signed and faxed.  She stated understanding

## 2018-03-13 ENCOUNTER — TELEPHONE (OUTPATIENT)
Dept: ORTHOPEDIC SURGERY | Age: 48
End: 2018-03-13

## 2018-03-13 NOTE — TELEPHONE ENCOUNTER
Phoned patient to ask about the form mentioned below, no form was provided, patient voice mail not set up. Will refax updated letter with patient WIN number written on it. If patient returns call letter is at the  with confirmed copies of faxes sent.

## 2018-03-13 NOTE — TELEPHONE ENCOUNTER
Patient called back as she needs the nurse she spoke with yesterday to redo the forms sent yesterday. Della Chapman told patient they still do not have note of 02/19/2018 and letter how long out of work. Patient sup called patient and was told Della Chapman required a WIN number on any documentation. Patient WIN no:  [id number] Riddhi Lam. Please refax note and form to Della Chapman with the WIN no at [phone].   Need today so patient will not lose her job

## 2018-04-09 ENCOUNTER — OFFICE VISIT (OUTPATIENT)
Dept: ORTHOPEDIC SURGERY | Age: 48
End: 2018-04-09

## 2018-04-09 VITALS
DIASTOLIC BLOOD PRESSURE: 80 MMHG | HEIGHT: 64 IN | BODY MASS INDEX: 29.37 KG/M2 | RESPIRATION RATE: 18 BRPM | HEART RATE: 91 BPM | WEIGHT: 172 LBS | TEMPERATURE: 97.4 F | SYSTOLIC BLOOD PRESSURE: 148 MMHG

## 2018-04-09 DIAGNOSIS — M62.838 MUSCLE SPASM: ICD-10-CM

## 2018-04-09 DIAGNOSIS — S39.012A LOW BACK STRAIN, INITIAL ENCOUNTER: Primary | ICD-10-CM

## 2018-04-09 DIAGNOSIS — M47.816 FACET ARTHROPATHY, LUMBAR: ICD-10-CM

## 2018-04-09 RX ORDER — PREDNISONE 20 MG/1
TABLET ORAL
Qty: 20 TAB | Refills: 0 | Status: SHIPPED | OUTPATIENT
Start: 2018-04-09

## 2018-04-09 RX ORDER — METHOCARBAMOL 750 MG/1
750 TABLET, FILM COATED ORAL
Qty: 20 TAB | Refills: 1 | Status: SHIPPED | OUTPATIENT
Start: 2018-04-09

## 2018-04-09 RX ORDER — KETOROLAC TROMETHAMINE 15 MG/ML
60 INJECTION, SOLUTION INTRAMUSCULAR; INTRAVENOUS ONCE
Qty: 1 VIAL | Refills: 0
Start: 2018-04-09 | End: 2018-04-09

## 2018-04-09 NOTE — LETTER
NOTIFICATION RETURN TO WORK 
 
4/9/2018 2:15 PM 
 
Ms. Mana [address], [facility] [address] [address] [address] To Whom It May Concern: 
 
Mana Reid is currently under the care of [address] [place] [address]. She was seen for a scheduled appointment today on April 9, 2018 and will return to work on April 17, 2018. If there are questions or concerns please have the patient contact our office. Sincerely, Makenzie Rodriguez NP

## 2018-04-09 NOTE — MR AVS SNAPSHOT
[facility] 
 
 
 [address] [facility] [address] 
[phone] Patient: Kristien Valverde MRN: [id number] Adam Byrne Visit Information Date & Time Provider Department Dept. Phone Encounter #  
 4/9/2018  1:40 PM Adan Caban NP [facility] [phone] [id number] Follow-up Instructions Return in about 6 weeks (around 5/21/2018). Your Appointments 5/21/2018  8:30 AM  
Follow Up with Patricia Childress MD  
[facility] ([address]) Appt Note: 3 mo f/u  
 [address] [facility] [address] [phone]  
  
   
 . [address] [facility] [address] Upcoming Health Maintenance Date Due DTaP/Tdap/Td series (1 - Tdap) 8/5/1993 PAP AKA CERVICAL CYTOLOGY 8/5/1993 Influenza Age 5 to Adult 8/1/2017 Allergies as of 4/9/2018  Review Complete On: 4/9/2018 By: Eliza Barrier Severity Noted Reaction Type Reactions Bactrim [Sulfamethoprim]  02/19/2018    Diarrhea, Nausea and Vomiting Current Immunizations  Never Reviewed No immunizations on file. Not reviewed this visit You Were Diagnosed With   
  
 Codes Comments Low back strain, initial encounter    -  Primary ICD-10-CM: S40.451V ICD-9-CM: 847.2 Muscle spasm     ICD-10-CM: C95.968 ICD-9-CM: 728.85 Facet arthropathy, lumbar ([facility]     ICD-10-CM: M46.96 
ICD-9-CM: 721.3 Vitals BP Pulse Temp Resp Height(growth percentile) Weight(growth percentile) 148/80 91 97.4 °F (36.3 °C) (Oral) 18 5' 4\" (1.626 m) 172 lb (78 kg) BMI Smoking Status 29.52 kg/m2 Never Smoker BMI and BSA Data Body Mass Index Body Surface Area  
 29.52 kg/m 2 1.88 m 2 Preferred Pharmacy [facility] [address] [facility] [address] [facility] Your Updated Medication List  
  
   
This list is accurate as of 4/9/18  2:23 PM.  Always use your most recent med list. amLODIPine 2.5 mg tablet Commonly known as:  Achilles Hewitt Take  by mouth daily. cyclobenzaprine 10 mg tablet Commonly known as:  FLEXERIL Take 1 Tab by mouth three (3) times daily as needed for Muscle Spasm(s). * diclofenac EC 75 mg EC tablet Commonly known as:  VOLTAREN Take 1 Tab by mouth two (2) times daily (with meals). * diclofenac EC 75 mg EC tablet Commonly known as:  VOLTAREN  
1 tab once to twice a day with food prn pain  
  
 escitalopram oxalate 10 mg tablet Commonly known as:  Julian Nini Take 1 Tab by mouth daily. gabapentin 300 mg capsule Commonly known as:  NEURONTIN Take 1 tab po QHS for 3 days then increase to 1 tab po BID.  
  
 ketorolac 15 mg/mL Soln injection Commonly known as:  TORADOL  
4 mL by IntraMUSCular route once for 1 dose. methocarbamol 750 mg tablet Commonly known as:  ROBAXIN Take 1 Tab by mouth two (2) times daily as needed. omeprazole 20 mg capsule Commonly known as:  PRILOSEC Take 1 Cap by mouth daily. Indications: PREVENTION OF NSAID-INDUCED GASTRIC ULCER  
  
 predniSONE 20 mg tablet Commonly known as:  Trip Arti Take 3 tabs po x 3 days, then 2 tabs po x 3 days, then 1 tabs po x 3 days, then 1/2 tab po x 4 day * topiramate 25 mg tablet Commonly known as:  TOPAMAX Take 1 tab po QHS for 5 nights, then increase to 2 tabs po QHS for 5 nights, then increase to 3 tabs po QHS thereafter. * topiramate 25 mg tablet Commonly known as:  TOPAMAX 3 tabs PO QHS * Notice: This list has 4 medication(s) that are the same as other medications prescribed for you. Read the directions carefully, and ask your doctor or other care provider to review them with you. Prescriptions Sent to Pharmacy  Refills  
 predniSONE (DELTASONE) 20 mg tablet 0  
 Sig: Take 3 tabs po x 3 days, then 2 tabs po x 3 days, then 1 tabs po x 3 days, then 1/2 tab po x 4 day Class: Normal  
 Pharmacy: [address] Ph #: [phone]  
 methocarbamol (ROBAXIN) 750 mg tablet 1 Sig: Take 1 Tab by mouth two (2) times daily as needed. Class: Normal  
 Pharmacy: [address] Ph #: [phone] Route: Oral  
  
We Performed the Following KETOROLAC TROMETHAMINE INJ [ \[facility]\""] [place] THER/PROPH/DIAG INJECTION, SUBCUT/IM [id number] CPT(R)] Follow-up Instructions Return in about 6 weeks (around 5/21/2018). Introducing [facility] SERVICES! Noam Farrell introduces Get[employer] patient portal. Now you can access parts of your medical record, email your doctor's office, and request medication refills online. 1. In your internet browser, go to [email or web address] 2. Click on the First Time User? Click Here link in the Sign In box. You will see the New Member Sign Up page. 3. Enter your Get[employer] Access Code exactly as it appears below. You will not need to use this code after youve completed the sign-up process. If you do not sign up before the expiration date, you must request a new code. · Get[employer] Access Code: [email or web address] Expires: 4/16/2018 11:11 AM 
 
4. Enter the last four digits of your Social Security Number (xxxx) and Date of Birth (mm/dd/yyyy) as indicated and click Submit. You will be taken to the next sign-up page. 5. Create a Kewegot ID. This will be your GetJob login ID and cannot be changed, so think of one that is secure and easy to remember. 6. Create a GetJob password. You can change your password at any time. 7. Enter your Password Reset Question and Answer. This can be used at a later time if you forget your password. 8. Enter your e-mail address. You will receive e-mail notification when new information is available in [address] 9. Click Sign Up. You can now view and download portions of your medical record. 10. Click the Download Summary menu link to download a portable copy of your medical information. If you have questions, please visit the Frequently Asked Questions section of the Hash[employer] website. Remember, HashTip is NOT to be used for urgent needs. For medical emergencies, dial 911. Now available from your iPhone and Android! Please provide this summary of care documentation to your next provider. Your primary care clinician is listed as Fritz Bryant. If you have any questions after today's visit, please call [phone].

## 2018-04-09 NOTE — PROGRESS NOTES
[address], [address]  [facility], [address]  Phone: [phone]  Fax: [phone]  PROGRESS NOTE  Patient: Kristine Valverde                MRN: [id number]       SSN: [id number]  YOB: 1970        AGE: 41 y.o. SEX: female  Body mass index is 29.52 kg/(m^2). PCP: Courtney Lozano MD  04/09/18    Chief Complaint   Patient presents with    Follow-up    Back Pain     lower back pain        HISTORY OF PRESENT ILLNESS:  Kristine Valverde is a 41 y.o.  female with history of chronic LBP and BLE pain for about a year. Prior history of back problems: recurrent self limited episodes of low back pain in the past, previous osteoarthritis of lumbar spine and previous disc bulge, L4-S1, possibly crossing the Left L5 nerve per LS MRI dated 1/22/18. Pain is aching. Pain is worse with lifting, twisting, squatting, bending and affects work, standing and recreational activities. Pain is better with lying down and heating pad. She comes in today in a wheelchair because her back pain has been so severe for the last 24-48hrs. She denies any injury or radiating leg pains. She is neuro intact with good strength and negative straight leg raise and without any nerve tension signs. She thinks it may be related to sweeping her floor. It consistent with an acute back strain. She reports that she has been to PT in the past and has recently stopped her exercises, I advised her to re-start those. She has not tried any steroids or a muscle relaxer. It has only been 2 days. She had a set of LS X-rays dated 1/22/18 show mild degenerative changes at L5/S1. States she has been using Topamax 75mg QHS and Diclofenac 75mg PRN with minimal, moderate, relief. . Denies bladder/bowel dysfunction, saddle paresthesia, weakness, gait disturbance, or other neurological deficits.  Denies chills, fever,night sweats, unexplained weight loss/weight gain, chest pain, sob or anxiety. Reports no new medical issues or hospitalizations since the last visit. Pt at this time desires to  continue with current care/proceed with medication evaluation/proceed with evaluation of LBP/strain. ASSESSMENT   Diagnoses and all orders for this visit:    1. Low back strain, initial encounter  -     KETOROLAC TROMETHAMINE INJ  -     THER/PROPH/DIAG INJECTION, SUBCUT/IM    2. Muscle spasm    3. Facet arthropathy, lumbar (HCC)  -     KETOROLAC TROMETHAMINE INJ  -     THER/PROPH/DIAG INJECTION, SUBCUT/IM    Other orders  -     ketorolac (TORADOL) 15 mg/mL soln injection; 4 mL by IntraMUSCular route once for 1 dose. -     predniSONE (DELTASONE) 20 mg tablet; Take 3 tabs po x 3 days, then 2 tabs po x 3 days, then 1 tabs po x 3 days, then 1/2 tab po x 4 day  -     methocarbamol (ROBAXIN) 750 mg tablet; Take 1 Tab by mouth two (2) times daily as needed. IMPRESSION AND PLAN:  This is a pt with an acute back strain and muscular spasms for 2 days after sweeping her floor. She is neuro intact. We discussed medications, she is not a diabetic. The risks, benefits, and potential side effects of  medications were discussed. Patient understands and wishes to proceed. Patient advised to call the office if intolerant to new medications. She is to re-start on a HEP, I will excuse her from work for 1 week. She works at [employer], no light duty available      1) Pt was given information on back exercises and back strain   2) Toradol inj followed by steroid pack, Stop Diclofenac  3) Continue Topamax  4) HEP  5) Ms. Mohit Arambula has a reminder for a \"due or due soon\" health maintenance. I have asked that she contact her primary care provider, Josem Schirmer, MD, for follow-up on this health maintenance.   6) We have informed patient to notify us for immediate appointment if he has any worsening neurogical symptoms or if an emergency situation presents, then call 911  7)  has been reviewed and is appropriate  8) Pt will follow-up in 4-6 weeks w/ me . Subjective    Work Work Note for 1 week    Smoking Status non-smoker    Pain Scale: 10 - Worst pain ever/10    Pain Assessment  4/9/2018   Location of Pain Back   Severity of Pain 10   Quality of Pain Aching; Sharp   Quality of Pain Comment tingling   Frequency of Pain -   Aggravating Factors Standing;Walking   Aggravating Factors Comment -   Limiting Behavior -   Relieving Factors Nothing   Relieving Factors Comment -   Result of Injury -   Work-Related Injury -   Type of Injury -         REVIEW OF SYSTEMS  Constitutional: Negative for fever, chills, or weight change. Respiratory: Negative for cough or shortness of breath. Cardiovascular: Negative for chest pain or palpitations. Gastrointestinal: Negative for incontinence, acid reflux, change in bowel habits, or constipation. Genitourinary: Negative for incontinence, dysuria and flank pain. Musculoskeletal: Positive for LBP-Left sided, muscular in nature  pain. See HPI. Skin: Negative for rash. Neurological:No  radiculopathy. See HPI. Endo/Heme/Allergies: Negative. Psychiatric/Behavioral: Negative. PHYSICAL EXAMINATION  Visit Vitals    /80    Pulse 91    Temp 97.4 °F (36.3 °C) (Oral)    Resp 18    Ht 5' 4\" (1.626 m)    Wt 172 lb (78 kg)    BMI 29.52 kg/m2         Accompanied by significant other. Constitutional:  Well developed, well nourished, in no acute distress. Psychiatric: Affect and mood are appropriate. Integumentary: No rashes or abrasions noted on exposed areas. Cardiovascular/Peripheral Vascular: +2 radial & pedal pulses. No peripheral edema is noted. Lymphatic:  No evidence of lymphedema. No cervical lymphadenopathy. SPINE/MUSCULOSKELETAL EXAM     Lumbar spine:  No rash, ecchymosis, or gross obliquity. No fasciculations. No focal atrophy is noted. Range of motion is discomfort with extension. Tenderness to palpation left low back w/ muscular tension.  No tenderness to palpation at the sciatic notch. SI joints non-tender. Trochanters non tender. Straight leg raise negative    Sensation grossly intact to light touch. MOTOR:     Hip Flex Quads Hamstrings Ankle DF EHL Ankle PF   Right +4/5 +4/5 +4/5 +4/5 +4/5 +4/5   Left +4/5 +4/5 +4/5 +4/5 +4/5 +4/5         DTRs are 1+ patella, and Achilles. Ambulation without assistive device. FWB. Forward bent postured gait. Note pt presented in office wheelchair, when asked to ambulate. She ambulated w/out difficulty. PAST MEDICAL HISTORY   Past Medical History:   Diagnosis Date    Hypertension        Past Surgical History:   Procedure Laterality Date    HX TUBAL LIGATION     . MEDICATIONS      Current Outpatient Prescriptions   Medication Sig Dispense Refill    ketorolac (TORADOL) 15 mg/mL soln injection 4 mL by IntraMUSCular route once for 1 dose. 1 Vial 0    predniSONE (DELTASONE) 20 mg tablet Take 3 tabs po x 3 days, then 2 tabs po x 3 days, then 1 tabs po x 3 days, then 1/2 tab po x 4 day 20 Tab 0    methocarbamol (ROBAXIN) 750 mg tablet Take 1 Tab by mouth two (2) times daily as needed. 20 Tab 1    topiramate (TOPAMAX) 25 mg tablet 3 tabs PO  Tab 1    gabapentin (NEURONTIN) 300 mg capsule Take 1 tab po QHS for 3 days then increase to 1 tab po BID. 60 Cap 1    omeprazole (PRILOSEC) 20 mg capsule Take 1 Cap by mouth daily. Indications: PREVENTION OF NSAID-INDUCED GASTRIC ULCER 30 Cap 0    amLODIPine (NORVASC) 2.5 mg tablet Take  by mouth daily.  cyclobenzaprine (FLEXERIL) 10 mg tablet Take 1 Tab by mouth three (3) times daily as needed for Muscle Spasm(s). 60 Tab 0    diclofenac EC (VOLTAREN) 75 mg EC tablet Take 1 Tab by mouth two (2) times daily (with meals). 60 Tab 0    escitalopram oxalate (LEXAPRO) 10 mg tablet Take 1 Tab by mouth daily.  30 Tab 0    diclofenac EC (VOLTAREN) 75 mg EC tablet 1 tab once to twice a day with food prn pain 60 Tab 1    topiramate (TOPAMAX) 25 mg tablet Take 1 tab po QHS for 5 nights, then increase to 2 tabs po QHS for 5 nights, then increase to 3 tabs po QHS thereafter. 90 Tab 1        ALLERGIES    Allergies   Allergen Reactions    Bactrim [Sulfamethoprim] Diarrhea and Nausea and Vomiting          SOCIAL HISTORY    Social History     Social History    Marital status:      Spouse name: N/A    Number of children: N/A    Years of education: N/A     Occupational History    Not on file.      Social History Main Topics    Smoking status: Never Smoker    Smokeless tobacco: Never Used    Alcohol use No    Drug use: No    Sexual activity: Yes     Partners: Male     Birth control/ protection: Surgical     Other Topics Concern    Not on file     Social History Narrative     Social History Narrative      Problem Relation Age of Onset    Diabetes Sister     Hypertension Sister     Elevated Lipids Sister     Hypertension Sister     Hypertension Sister     Hypertension Sister     Hypertension Sister     Hypertension Sister          Chichi Tovar NP

## 2022-03-18 PROBLEM — M54.16 LUMBAR RADICULOPATHY: Status: ACTIVE | Noted: 2017-12-14

## 2022-03-18 PROBLEM — M47.816 FACET ARTHROPATHY, LUMBAR: Status: ACTIVE | Noted: 2018-04-09

## 2022-03-18 PROBLEM — M62.838 MUSCLE SPASM: Status: ACTIVE | Noted: 2018-04-09

## 2022-03-19 PROBLEM — S39.012A LOW BACK STRAIN, INITIAL ENCOUNTER: Status: ACTIVE | Noted: 2018-04-09

## 2023-01-31 RX ORDER — ESCITALOPRAM OXALATE 10 MG/1
10 TABLET ORAL DAILY
COMMUNITY
Start: 2017-01-16

## 2023-01-31 RX ORDER — TOPIRAMATE 25 MG/1
TABLET ORAL
COMMUNITY
Start: 2018-01-22

## 2023-01-31 RX ORDER — AMLODIPINE BESYLATE 2.5 MG/1
TABLET ORAL DAILY
COMMUNITY

## 2023-01-31 RX ORDER — DICLOFENAC SODIUM 75 MG/1
TABLET, DELAYED RELEASE ORAL
COMMUNITY
Start: 2017-10-17

## 2023-01-31 RX ORDER — CYCLOBENZAPRINE HCL 10 MG
10 TABLET ORAL 3 TIMES DAILY PRN
COMMUNITY
Start: 2017-11-06

## 2023-01-31 RX ORDER — GABAPENTIN 300 MG/1
CAPSULE ORAL
COMMUNITY
Start: 2017-12-14

## 2023-01-31 RX ORDER — OMEPRAZOLE 20 MG/1
20 CAPSULE, DELAYED RELEASE ORAL DAILY
COMMUNITY
Start: 2017-11-20

## 2023-01-31 RX ORDER — METHOCARBAMOL 750 MG/1
750 TABLET, FILM COATED ORAL 2 TIMES DAILY PRN
COMMUNITY
Start: 2018-04-09

## 2023-01-31 RX ORDER — PREDNISONE 20 MG/1
TABLET ORAL
COMMUNITY
Start: 2018-04-09

## (undated) DEVICE — (D)SYR 10ML 1/5ML GRAD NSAF -- PKGING CHANGE USE ITEM 338027

## (undated) DEVICE — (D)BNDG ADHESIVE FABRIC 3/4X3 -- DISC BY MFR USE ITEM 357960

## (undated) DEVICE — (D)NDL SPNE 22GX15CM -- DISC BY MFR W/NO SUB

## (undated) DEVICE — MEDIA CONTRAST 10ML 200MG/ML 41%

## (undated) DEVICE — AVANOS* SHORT BEVEL NEEDLE: Brand: AVANOS

## (undated) DEVICE — TRAY MYEL SFTY +